# Patient Record
Sex: FEMALE | Race: WHITE | Employment: FULL TIME | URBAN - METROPOLITAN AREA
[De-identification: names, ages, dates, MRNs, and addresses within clinical notes are randomized per-mention and may not be internally consistent; named-entity substitution may affect disease eponyms.]

---

## 2017-03-23 ENCOUNTER — HOSPITAL ENCOUNTER (EMERGENCY)
Age: 52
Discharge: HOME | End: 2017-03-23
Attending: EMERGENCY MEDICINE

## 2017-03-23 VITALS
HEIGHT: 62 IN | OXYGEN SATURATION: 100 % | TEMPERATURE: 97.6 F | RESPIRATION RATE: 18 BRPM | WEIGHT: 158 LBS | HEART RATE: 81 BPM | SYSTOLIC BLOOD PRESSURE: 164 MMHG | BODY MASS INDEX: 29.08 KG/M2 | DIASTOLIC BLOOD PRESSURE: 85 MMHG

## 2017-03-23 DIAGNOSIS — Z20.7 EXPOSURE TO HEAD LICE: Primary | ICD-10-CM

## 2017-03-23 NOTE — UC PROVIDER NOTE
Patient is a 45 y.o. female presenting with lice. The history is provided by the patient. Head Lice   This is a new (Daughter has head lice so she wants to be checked) problem. Past Medical History:   Diagnosis Date    Breast cancer ([facility]     Left. Txd with chemo,radiation. Dr. Juan Francisco Torres of elbow     Left.  Chickenpox childhood    Diabetes mellitus type 2, controlled, without complications ([facility] 82//23    Dr. Daly Degree. Dr. Twyla Singh, ophth.  Dry eyes     Blair Garcias, OD. Dr. Robert Vela Elevated cholesterol     Environmental allergies     Gestational diabetes 2002    Glaucoma suspect of both eyes     [place]. Dr. Russ Childs.  Heartburn 08    Mononucleosis 2006    Vitamin D deficiency         Past Surgical History:   Procedure Laterality Date    HX BREAST BIOPSY Left 1998    due to cancer. Dr. Kirstin Rivas HX BREAST LUMPECTOMY Left 1999    with 18 lymph nodes removed. Dr. Katiana Sesay   ,     Dr. Brayden Matias         Family History   Problem Relation Age of Onset    Hypertension Mother     Diabetes Mother     High Cholesterol Mother     Heart Disease Father     Heart Surgery Father      CABG    Diabetes Father     Glaucoma Father     Colon Cancer Father 68    Breast Cancer Maternal Aunt      x2    Cancer Paternal Uncle      ?laryngeal    Diabetes Paternal Grandmother     Glaucoma Paternal Aunt         Social History     Social History    Marital status:      Spouse name: N/A    Number of children: N/A    Years of education: N/A     Occupational History    Not on file.      Social History Main Topics    Smoking status: Never Smoker    Smokeless tobacco: Never Used    Alcohol use 0.0 oz/week     0 Cans of beer per week      Comment: RARE    Drug use: No    Sexual activity: Yes     Partners: Male     Birth control/ protection: Surgical      Comment:  had a vasectomy     Other Topics Concern    Not on file     Social History Narrative                ALLERGIES: Review of patient's allergies indicates no known allergies. Review of Systems   Constitutional: Negative for activity change. HENT: Negative for congestion. Respiratory: Negative for cough. Skin: Negative for rash. Vitals:    03/23/17 0914   BP: 164/85   Pulse: 81   Resp: 18   Temp: 97.6 °F (36.4 °C)   SpO2: 100%   Weight: 71.7 kg (158 lb)   Height: 5' 2\" (1.575 m)       Physical Exam   Constitutional: She is oriented to person, place, and time. She appears well-developed and well-nourished. HENT:   Head: Normocephalic. No evidence of head lice   Eyes: EOM are normal.   Pulmonary/Chest: Effort normal.   Neurological: She is alert and oriented to person, place, and time. Skin: Skin is warm and dry. Psychiatric: She has a normal mood and affect. Her behavior is normal. Judgment and thought content normal.   Nursing note and vitals reviewed. MDM     Differential Diagnosis; Clinical Impression; Plan:     CLINICAL IMPRESSION:  Exposure to head lice  (primary encounter diagnosis)    Plan:  1.  Follow up with PCP  2.   3.   Risk of Significant Complications, Morbidity, and/or Mortality:   Presenting problems:  Low  Diagnostic procedures:  Low  Management options:  Low  Progress:   Patient progress:  Stable      Procedures

## 2017-06-23 DIAGNOSIS — E55.9 VITAMIN D DEFICIENCY: ICD-10-CM

## 2017-06-23 DIAGNOSIS — E11.9 CONTROLLED TYPE 2 DIABETES MELLITUS WITHOUT COMPLICATION, WITHOUT LONG-TERM CURRENT USE OF INSULIN (HCC): Primary | ICD-10-CM

## 2017-06-23 DIAGNOSIS — E78.2 MIXED DYSLIPIDEMIA: ICD-10-CM

## 2017-06-24 LAB
25(OH)D3+25(OH)D2 SERPL-MCNC: 33.2 NG/ML (ref 30–100)
ALBUMIN SERPL-MCNC: 4.1 G/DL (ref 3.5–5.5)
ALBUMIN/GLOB SERPL: 1.6 {RATIO} (ref 1.2–2.2)
ALP SERPL-CCNC: 67 IU/L (ref 39–117)
ALT SERPL-CCNC: 15 IU/L (ref 0–32)
APPEARANCE UR: CLEAR
AST SERPL-CCNC: 16 IU/L (ref 0–40)
BILIRUB SERPL-MCNC: 0.3 MG/DL (ref 0–1.2)
BILIRUB UR QL STRIP: NEGATIVE
BUN SERPL-MCNC: 19 MG/DL (ref 6–24)
BUN/CREAT SERPL: 32 (ref 9–23)
CALCIUM SERPL-MCNC: 9.3 MG/DL (ref 8.7–10.2)
CHLORIDE SERPL-SCNC: 98 MMOL/L (ref 96–106)
CHOLEST SERPL-MCNC: 246 MG/DL (ref 100–199)
CO2 SERPL-SCNC: 23 MMOL/L (ref 18–29)
COLOR UR: YELLOW
CREAT SERPL-MCNC: 0.6 MG/DL (ref 0.57–1)
EST. AVERAGE GLUCOSE BLD GHB EST-MCNC: 126 MG/DL
GLOBULIN SER CALC-MCNC: 2.6 G/DL (ref 1.5–4.5)
GLUCOSE SERPL-MCNC: 109 MG/DL (ref 65–99)
GLUCOSE UR QL: ABNORMAL
HBA1C MFR BLD: 6 % (ref 4.8–5.6)
HDLC SERPL-MCNC: 40 MG/DL
HGB UR QL STRIP: NEGATIVE
INTERPRETATION, 910389: NORMAL
KETONES UR QL STRIP: NEGATIVE
LDLC SERPL CALC-MCNC: 176 MG/DL (ref 0–99)
LEUKOCYTE ESTERASE UR QL STRIP: NEGATIVE
Lab: NORMAL
MICRO URNS: ABNORMAL
NITRITE UR QL STRIP: NEGATIVE
PH UR STRIP: 6 [PH] (ref 5–7.5)
POTASSIUM SERPL-SCNC: 4.5 MMOL/L (ref 3.5–5.2)
PROT SERPL-MCNC: 6.7 G/DL (ref 6–8.5)
PROT UR QL STRIP: NEGATIVE
SODIUM SERPL-SCNC: 139 MMOL/L (ref 134–144)
SP GR UR: 1.03 (ref 1–1.03)
TRIGL SERPL-MCNC: 148 MG/DL (ref 0–149)
TSH SERPL DL<=0.005 MIU/L-ACNC: 2.6 UIU/ML (ref 0.45–4.5)
UROBILINOGEN UR STRIP-MCNC: 0.2 MG/DL (ref 0.2–1)
VLDLC SERPL CALC-MCNC: 30 MG/DL (ref 5–40)

## 2017-06-29 ENCOUNTER — OFFICE VISIT (OUTPATIENT)
Dept: FAMILY MEDICINE CLINIC | Age: 52
End: 2017-06-29

## 2017-06-29 VITALS
HEIGHT: 62 IN | DIASTOLIC BLOOD PRESSURE: 72 MMHG | TEMPERATURE: 98.1 F | OXYGEN SATURATION: 98 % | SYSTOLIC BLOOD PRESSURE: 109 MMHG | HEART RATE: 66 BPM | WEIGHT: 162.2 LBS | RESPIRATION RATE: 16 BRPM | BODY MASS INDEX: 29.85 KG/M2

## 2017-06-29 DIAGNOSIS — R76.8 POSITIVE HEPATITIS C ANTIBODY TEST: ICD-10-CM

## 2017-06-29 DIAGNOSIS — Z83.511 FAMILY HISTORY OF GLAUCOMA IN FATHER: ICD-10-CM

## 2017-06-29 DIAGNOSIS — E66.3 OVERWEIGHT (BMI 25.0-29.9): ICD-10-CM

## 2017-06-29 DIAGNOSIS — H40.003 GLAUCOMA SUSPECT OF BOTH EYES: ICD-10-CM

## 2017-06-29 DIAGNOSIS — E78.2 MIXED DYSLIPIDEMIA: ICD-10-CM

## 2017-06-29 DIAGNOSIS — E11.9 CONTROLLED TYPE 2 DIABETES MELLITUS WITHOUT COMPLICATION, WITHOUT LONG-TERM CURRENT USE OF INSULIN (HCC): Primary | ICD-10-CM

## 2017-06-29 DIAGNOSIS — N93.8 DUB (DYSFUNCTIONAL UTERINE BLEEDING): ICD-10-CM

## 2017-06-29 DIAGNOSIS — E55.9 VITAMIN D DEFICIENCY: ICD-10-CM

## 2017-06-29 DIAGNOSIS — R76.8 FALSE POSITIVE SEROLOGICAL TEST FOR HEPATITIS C: ICD-10-CM

## 2017-06-29 DIAGNOSIS — Z85.3 PERSONAL HISTORY OF BREAST CANCER: ICD-10-CM

## 2017-06-29 RX ORDER — GLUCOSAMINE SULFATE 1500 MG
POWDER IN PACKET (EA) ORAL DAILY
COMMUNITY

## 2017-06-29 NOTE — PROGRESS NOTES
Chief Complaint   Patient presents with    Weight Management    Blood Pressure Check    Diabetes     A1C on 6/23/17 was 6.0    Documentation     release form from PCP approving pt to participate in weight loss program with child through [facility]; also wants to know what she and staff can do to get health insurance to cover invokana, insurance wants pt to take metformin per letter sent to pt from insurance company     1. Have you been to the ER, urgent care clinic since your last visit? Hospitalized since your last visit? Pt has been to  once since lov. 2. Have you seen or consulted any other health care providers outside of the [address] [place] [address] since your last visit? Include any pap smears or colon screening. No    In the event something were to happen to you and you were unable to speak on your behalf, do you have an Advance Directive/ Living Will in place stating your wishes? NO    If yes, do we have a copy on file NO    If no, would you like information:   Pt offered and declined.

## 2017-06-29 NOTE — PROGRESS NOTES
HISTORY OF PRESENT ILLNESS  Miguel Lopez is a 45 y.o. female presents with Results; Blood Pressure Check; Diabetes (A1C on 6/23/17 was 6.0); and Documentation (release form from PCP approving pt to participate in weight loss program with child through [facility]; also wants to know what she and staff can do to get health insurance to cover invokana, insurance wants pt to take metformin per letter sent to pt from insurance company)    Agree with nurse note. Diabetic pt with mixed dyslipidemia and Vit D deficiency presents to the office with a BP of 109/72. She weighs 162 lbs, lost 3 lbs since last ov. She requests her most recent labs from 06/23/17. Glucose was 109. BUN/creatinine was 32. Urine contained 3+ glucose. Hgb A1C was 6.0. LDL was 176, up from 75. Vit D was 33.2. TSH was 2.6. She forgets to take Crestor 20 mg at night. For DM, she takes Invokamet  mg daily, tolerating well. She received a letter from [address] [place], [place] [id number] 9 E dated 06/20/17 stating that preferred medications for DM are Metformin ER or Metformin containing products. She has a physician release form to be completed for the [address] [place] [address] Medicine, department of Peds and IM. It is a a diet and exercise program x16 weeks. Pt with DUB. Menses still come and go sporadically. She has a hx of a positive Hep C test. Confirmatory test was negative. Health Maintenance    Pt was referred in 12/2016 for colonoscopy and plans to schedule this soon. Pt who is a glaucoma suspect with family hx of glaucoma. plans to schedule yearly eye exam soon. Pt with hx of breast cancer; no recurrence. Her most recent mammogram was on 12/19/16; normal.     Written by ralph Crowder, as dictated by Dr. Ivan Jorge DO.    ROS    Review of Systems negative except as noted above in HPI.     ALLERGIES:  No Known Allergies    CURRENT MEDICATIONS:    Outpatient Prescriptions Marked as Taking for the 17 encounter (Office Visit) with Mikel Steiner, DO   Medication Sig Dispense Refill    cholecalciferol (VITAMIN D3) 1,000 unit cap Take  by mouth daily.  canagliflozin-metFORMIN (INVOKAMET)  mg tab Take 1 Tab by mouth daily. Indications: type 2 diabetes mellitus 30 Tab 11    rosuvastatin (CRESTOR) 20 mg tablet Take 1 Tab by mouth nightly. Indications: HYPERLIPIDEMIA 90 Tab 3    enalapril (VASOTEC) 2.5 mg tablet Take 1 Tab by mouth daily. Indications: DIABETIC NEPHROPATHY 30 Tab 5    ibuprofen (MOTRIN) 600 mg tablet Take 1 Tab by mouth every six (6) hours as needed for Pain. 20 Tab 0       PAST MEDICAL HISTORY:    Past Medical History:   Diagnosis Date    Breast cancer ([facility]     Left. Txd with chemo,radiation. Dr. Palma Burn of elbow     Left.  Chickenpox childhood    Diabetes mellitus type 2, controlled, without complications ([facility]     Dr. Liam Hunter. Dr. Jacob Khalil, ophth.  Dry eyes     Blair Garcias, SELAM. Dr. Marion Doing Elevated cholesterol     Environmental allergies     Gestational diabetes 2002    Glaucoma suspect of both eyes     [place]. Dr. Rajendra William.  Heartburn 08    Mononucleosis 2006    Vitamin D deficiency        PAST SURGICAL HISTORY:    Past Surgical History:   Procedure Laterality Date    HX BREAST BIOPSY Left 1998    due to cancer. Dr. Susan Pires HX BREAST LUMPECTOMY Left 1999    with 18 lymph nodes removed.  Dr. Marcela Salazar   ,     Dr. Marley Herron:    Family History   Problem Relation Age of Onset    Hypertension Mother     Diabetes Mother     High Cholesterol Mother     Heart Disease Father     Heart Surgery Father      CABG    Diabetes Father     Glaucoma Father     Colon Cancer Father 68    Breast Cancer Maternal Aunt      x2    Cancer Paternal Uncle      ?laryngeal    Diabetes Paternal Grandmother    [facility] Glaucoma Paternal Aunt        SOCIAL HISTORY:    Social History     Social History    Marital status:      Spouse name: N/A    Number of children: N/A    Years of education: N/A     Social History Main Topics    Smoking status: Never Smoker    Smokeless tobacco: Never Used    Alcohol use 0.0 oz/week     0 Cans of beer per week      Comment: RARE    Drug use: No    Sexual activity: Yes     Partners: Male     Birth control/ protection: Surgical      Comment:  had a vasectomy     Other Topics Concern    None     Social History Narrative       IMMUNIZATIONS:    Immunization History   Administered Date(s) Administered    Influenza Vaccine 11/09/2015    Influenza Vaccine Split 11/03/2011    Influenza Vaccine Whole 10/01/2009         PHYSICAL EXAMINATION    Vital Signs    Visit Vitals    /72 (BP 1 Location: Right arm, BP Patient Position: Sitting)    Pulse 66    Temp 98.1 °F (36.7 °C) (Oral)    Resp 16    Ht 5' 2.01\" (1.575 m)    Wt 162 lb 3.2 oz (73.6 kg)    SpO2 98%    BMI 29.66 kg/m2       Weight Metrics 6/29/2017 3/23/2017 12/28/2016 8/1/2016 4/22/2016 10/22/2015 6/9/2015   Weight 162 lb 3.2 oz 158 lb 165 lb 8 oz 177 lb 3.2 oz 196 lb 9.6 oz 195 lb 9.6 oz 197 lb   BMI 29.66 kg/m2 28.9 kg/m2 29.79 kg/m2 31.87 kg/m2 35.36 kg/m2 35.5 kg/m2 36.02 kg/m2       General appearance - Well nourished. Well appearing. Well developed. No acute distress. Overweight. Head - Normocephalic. Atraumatic. Eyes - pupils equal and reactive. Extraocular eye movements intact. Sclera anicteric. Mildly injected sclera. Ears - Hearing is grossly normal bilaterally. Nose - normal and patent. No polyps noted. No erythema. No discharge. Mouth - mucous membranes with adequate moisture. Posterior pharynx normal with cobblestone appearance. No erythema, white exudate or obstruction. Neck - supple. Midline trachea. No carotid bruits noted bilaterally. No thyromegaly noted.   Chest - clear to auscultation bilaterally anteriorly and posteriorly. No wheezes. No rales or rhonchi. Breath sounds are symmetrical bilaterally. Unlabored respirations. Heart - normal rate. Regular rhythm. Normal S1, S2. No murmur noted. No rubs, clicks or gallops noted. Abdomen - soft and distended. No masses or organomegaly. No rebound, rigidity or guarding. Bowel sounds normal x 4 quadrants. No tenderness noted. Neurological - awake, alert and oriented to person, place, and time and event. Cranial nerves II through XII intact. Clear speech. Muscle strength is +5/5 x 4 extremities. Sensation is intact to light touch bilaterally. Steady gait. Heme/Lymph - peripheral pulses normal x 4 extremities. No peripheral edema is noted. Musculoskeletal - Intact x 4 extremities. Full ROM x 4 extremities. No pain with movement. Back exam - normal range of motion. No pain on palpation of the spinous processes in the cervical, thoracic, lumbar, sacral regions. No CVA tenderness. Skin - no rashes, erythema, ecchymosis, lacerations, abrasions, suspicious moles noted  Psychological -   normal behavior, dress and thought processes. Good insight. Good eye contact. Normal affect. Appropriate mood. Normal speech.       DATA REVIEWED    Results for orders placed or performed in visit on 06/23/17   TSH 3RD GENERATION   Result Value Ref Range    TSH 2.600 0.450 - 4.500 uIU/mL   VITAMIN D, 25 HYDROXY   Result Value Ref Range    VITAMIN D, 25-HYDROXY 33.2 30.0 - 100.0 ng/mL   LIPID PANEL   Result Value Ref Range    Cholesterol, total 246 (H) 100 - 199 mg/dL    Triglyceride 148 0 - 149 mg/dL    HDL Cholesterol 40 >39 mg/dL    VLDL, calculated 30 5 - 40 mg/dL    LDL, calculated 176 (H) 0 - 99 mg/dL   HEMOGLOBIN A1C WITH EAG   Result Value Ref Range    Hemoglobin A1c 6.0 (H) 4.8 - 5.6 %    Estimated average glucose 126 mg/dL   URINALYSIS W/ RFLX MICROSCOPIC   Result Value Ref Range    Specific Gravity 1.027 1.005 - 1.030    pH (UA) 6.0 5.0 - 7.5    Color Yellow Yellow    Appearance Clear Clear    Leukocyte Esterase Negative Negative    Protein Negative Negative/Trace    Glucose 3+ (A) Negative    Ketone Negative Negative    Blood Negative Negative    Bilirubin Negative Negative    Urobilinogen 0.2 0.2 - 1.0 mg/dL    Nitrites Negative Negative    Microscopic Examination Comment    METABOLIC PANEL, COMPREHENSIVE   Result Value Ref Range    Glucose 109 (H) 65 - 99 mg/dL    BUN 19 6 - 24 mg/dL    Creatinine 0.60 0.57 - 1.00 mg/dL    GFR est non- >59 mL/min/1.73    GFR est  >59 mL/min/1.73    BUN/Creatinine ratio 32 (H) 9 - 23    Sodium 139 134 - 144 mmol/L    Potassium 4.5 3.5 - 5.2 mmol/L    Chloride 98 96 - 106 mmol/L    CO2 23 18 - 29 mmol/L    Calcium 9.3 8.7 - 10.2 mg/dL    Protein, total 6.7 6.0 - 8.5 g/dL    Albumin 4.1 3.5 - 5.5 g/dL    GLOBULIN, TOTAL 2.6 1.5 - 4.5 g/dL    A-G Ratio 1.6 1.2 - 2.2    Bilirubin, total 0.3 0.0 - 1.2 mg/dL    Alk. phosphatase 67 39 - 117 IU/L    AST (SGOT) 16 0 - 40 IU/L    ALT (SGPT) 15 0 - 32 IU/L   CVD REPORT   Result Value Ref Range    INTERPRETATION Note    DIABETES PATIENT EDUCATION   Result Value Ref Range    PDF Image Not applicable      Lab Results   Component Value Date/Time    Microalb/Creat ratio (ug/mg creat.) 7.0 12/21/2016 08:07 AM     Lab Results   Component Value Date/Time    Hep C Virus Ab 2.3 12/21/2016 08:07 AM       ASSESSMENT and PLAN      ICD-10-CM ICD-9-CM    1. Controlled type 2 diabetes mellitus without complication, without long-term current use of insulin (HCC) E11.9 250.00 canagliflozin-metFORMIN (INVOKAMET)  mg tab   2. Mixed dyslipidemia E78.2 272.2     worse with occ Crestor 20 mg use   3. Vitamin D deficiency E55.9 268.9 cholecalciferol (VITAMIN D3) 1,000 unit cap    stable   4. Glaucoma suspect of both eyes H40.003 365.00    5. Family history of glaucoma in father Z80.65 V19.11    6.  Personal history of breast cancer Z85.3 V10.3     stable 7. Positive hepatitis C antibody test R76.8 795.79    8. False positive serological test for hepatitis C R76.8 795.79     negative confirmatory test   9. Overweight (BMI 25.0-29. 9) E66.3 278.02    10. DUB (dysfunctional uterine bleeding) N93.8 626.8        Discussed the patient's BMI with her. The BMI follow up plan is as follows: I have counseled this patient on diet and exercise regimens. Decrease carbohydrates (white foods, sweet foods, sweet drinks and alcohol), increase green leafy vegetables and protein (lean meats and beans) with each meal.  Avoid fried foods. Eat 3-5 small meals daily. Do not skip meals. Increase water intake. Increase physical activity to 30 minutes daily for health benefit or 60 minutes daily to prevent weight regain, as tolerated. Get 7-8 hours uninterrupted sleep nightly. Chart reviewed and updated. My nurse, Dominique Walsh, called [phone] number and confirmed pt is approved from 05/30/17 until 06/29/18 for Invokamet. Continue current medications and care. Recommend OTC Vit D 5,000IU daily. Recommend keeping Crestor by toothbrush to help you remember to take it at night. Prescriptions written and sent to pharmacy; medication side effects discussed. Invokamet  mg. Most recent tests reviewed from 06/23/17. Recheck pertinent labs when fasting in 3 months. Counseled patient on health concerns: DM, BP, and cholesterol. Immunizations noted. Offered empathy, support, legitimation, prayers, partnership to patient. Praised patient for progress. [facility] [employer] [facility] Release Form completed. Original given to patient today and copied to the chart. SEE SCANNED DOCUMENT. Follow-up Disposition:  Return in about 6 months (around 12/29/2017) for dm, bp, results, cholesterol. Patient was offered a choice/choices in the treatment plan today. Patient expresses understanding of the plan and agrees with recommendations. Patient declines any additional handouts. Patient is satisfied with previous handouts received from our office    Written by ralph Bocanegra, as dictated by Dr. Jak Alejandre DO. Documentation True and Accepted by Mariusz Garcia.  Jada Sims.

## 2017-06-29 NOTE — MR AVS SNAPSHOT
Visit Information Date & Time Provider Department Dept. Phone Encounter #  
 6/29/2017  8:45 AM Sadie Castañeda DO [facility] [phone] Follow-up Instructions Return in about 6 months (around 12/29/2017) for dm, bp, results, cholesterol. Upcoming Health Maintenance Date Due FOBT Q 1 YEAR AGE 50-75 10/20/2017* EYE EXAM RETINAL OR DILATED Q1 12/14/2017* INFLUENZA AGE 9 TO ADULT 8/1/2017 MICROALBUMIN Q1 12/21/2017 HEMOGLOBIN A1C Q6M 12/23/2017 FOOT EXAM Q1 12/28/2017 LIPID PANEL Q1 6/23/2018 PAP AKA CERVICAL CYTOLOGY 11/9/2018 BREAST CANCER SCRN MAMMOGRAM 12/16/2018 DTaP/Tdap/Td series (2 - Td) 6/29/2027 *Topic was postponed. The date shown is not the original due date. Allergies as of 6/29/2017  Review Complete On: 6/29/2017 By: Sadie Castañeda DO No Known Allergies Current Immunizations  Reviewed on 6/29/2017 Name Date Influenza Vaccine 11/9/2015 Influenza Vaccine Split 11/3/2011 Influenza Vaccine Whole 10/1/2009 Reviewed by Sadie Castañeda DO on 6/29/2017 at  9:39 AM  
You Were Diagnosed With   
  
 Codes Comments Controlled type 2 diabetes mellitus without complication, without long-term current use of insulin ([facility]    -  Primary ICD-10-CM: E11.9 ICD-9-CM: 250.00 Mixed dyslipidemia     ICD-10-CM: E78.2 ICD-9-CM: 272.2 worse with occ Crestor 20 mg use Vitamin D deficiency     ICD-10-CM: E55.9 ICD-9-CM: 268.9 stable Glaucoma suspect of both eyes     ICD-10-CM: H40.003 ICD-9-CM: 365.00 Family history of glaucoma in father     ICD-10-CM: Z80.65 ICD-9-CM: V19.11 Personal history of breast cancer     ICD-10-CM: Z85.3 ICD-9-CM: V10.3 stable Positive hepatitis C antibody test     ICD-10-CM: R76.8 ICD-9-CM: 795.79 False positive serological test for hepatitis C     ICD-10-CM: R76.8 ICD-9-CM: 795.79 negative confirmatory test  
 Overweight (BMI 25.0-29. 9)     ICD-10-CM: E42.7 ICD-9-CM: 278.02   
 DUB (dysfunctional uterine bleeding)     ICD-10-CM: N93.8 ICD-9-CM: 626.8 Vitals BP Pulse Temp Resp Height(growth percentile) Weight(growth percentile) 109/72 (BP 1 Location: Right arm, BP Patient Position: Sitting) 66 98.1 °F (36.7 °C) (Oral) 16 5' 2.01\" (1.575 m) 162 lb 3.2 oz (73.6 kg) SpO2 BMI OB Status Smoking Status 98% 29.66 kg/m2 Menopause Never Smoker BMI and BSA Data Body Mass Index Body Surface Area  
 29.66 kg/m 2 1.79 m 2 Preferred Pharmacy Pharmacy Name Phone [address] [facility] [phone] Your Updated Medication List  
  
   
This list is accurate as of: 6/29/17  9:46 AM.  Always use your most recent med list.  
  
  
  
  
 canagliflozin-metFORMIN  mg Tab Commonly known as:  Sissy Dues Take 1 Tab by mouth daily. Indications: type 2 diabetes mellitus  
  
 enalapril 2.5 mg tablet Commonly known as:  Edwin Clapper Take 1 Tab by mouth daily. Indications: DIABETIC NEPHROPATHY  
  
 ibuprofen 600 mg tablet Commonly known as:  MOTRIN Take 1 Tab by mouth every six (6) hours as needed for Pain. rosuvastatin 20 mg tablet Commonly known as:  CRESTOR Take 1 Tab by mouth nightly. Indications: HYPERLIPIDEMIA  
  
 VITAMIN D3 1,000 unit Cap Generic drug:  cholecalciferol Take  by mouth daily. Prescriptions Sent to Pharmacy Refills  
 canagliflozin-metFORMIN (INVOKAMET)  mg tab 11 Sig: Take 1 Tab by mouth daily. Indications: type 2 diabetes mellitus Class: Normal  
 Pharmacy: [address] [facility] #: [phone] Route: Oral  
  
Follow-up Instructions Return in about 6 months (around 12/29/2017) for dm, bp, results, cholesterol. Introducing [facility] HE[facility]! [facility] introduces [employer] patient portal. Now you can access parts of your medical record, email your doctor's office, and request medication refills online. 1. In your internet browser, go to [email or web address] 2. Click on the First Time User? Click Here link in the Sign In box. You will see the New Member Sign Up page. 3. Enter your Dotted Block Access Code exactly as it appears below. You will not need to use this code after youve completed the sign-up process. If you do not sign up before the expiration date, you must request a new code. · Dotted Block Access Code: [email or web address] Expires: 9/27/2017  9:46 AM 
 
4. Enter the last four digits of your Social Security Number (xxxx) and Date of Birth (mm/dd/yyyy) as indicated and click Submit. You will be taken to the next sign-up page. 5. Create a Dotted Block ID. This will be your Dotted Block login ID and cannot be changed, so think of one that is secure and easy to remember. 6. Create a Dotted Block password. You can change your password at any time. 7. Enter your Password Reset Question and Answer. This can be used at a later time if you forget your password. 8. Enter your e-mail address. You will receive e-mail notification when new information is available in [address] 9. Click Sign Up. You can now view and download portions of your medical record. 10. Click the Download Summary menu link to download a portable copy of your medical information. If you have questions, please visit the Frequently Asked Questions section of the Dotted Block website. Remember, Dotted Block is NOT to be used for urgent needs. For medical emergencies, dial 911. Now available from your iPhone and Android! Please provide this summary of care documentation to your next provider. Your primary care clinician is listed as Jaden Moore. If you have any questions after today's visit, please call [phone].

## 2017-07-14 DIAGNOSIS — E11.9 CONTROLLED TYPE 2 DIABETES MELLITUS WITHOUT COMPLICATION, WITHOUT LONG-TERM CURRENT USE OF INSULIN (HCC): ICD-10-CM

## 2017-07-17 RX ORDER — CANAGLIFLOZIN AND METFORMIN HYDROCHLORIDE 50; 500 MG/1; MG/1
TABLET, FILM COATED ORAL
Qty: 30 TAB | Refills: 11 | Status: SHIPPED | OUTPATIENT
Start: 2017-07-17 | End: 2018-03-01 | Stop reason: SDUPTHER

## 2017-10-19 DIAGNOSIS — R80.9 MICROALBUMINURIA: ICD-10-CM

## 2017-10-19 DIAGNOSIS — E11.9 DIABETES MELLITUS TYPE 2, CONTROLLED, WITHOUT COMPLICATIONS (HCC): ICD-10-CM

## 2017-10-23 NOTE — TELEPHONE ENCOUNTER
Future Appointments:  12/29/2017 10:00 AM   DO REYMUNDO Carter      Last Appointment With Me:  6/29/2017

## 2017-10-24 RX ORDER — ENALAPRIL MALEATE 2.5 MG/1
TABLET ORAL
Qty: 90 TAB | Refills: 0 | Status: SHIPPED | OUTPATIENT
Start: 2017-10-24 | End: 2017-12-29 | Stop reason: SDUPTHER

## 2017-12-29 ENCOUNTER — OFFICE VISIT (OUTPATIENT)
Dept: FAMILY MEDICINE CLINIC | Age: 52
End: 2017-12-29

## 2017-12-29 VITALS
OXYGEN SATURATION: 99 % | DIASTOLIC BLOOD PRESSURE: 79 MMHG | BODY MASS INDEX: 30.95 KG/M2 | HEIGHT: 62 IN | HEART RATE: 68 BPM | TEMPERATURE: 97.5 F | SYSTOLIC BLOOD PRESSURE: 116 MMHG | RESPIRATION RATE: 18 BRPM | WEIGHT: 168.2 LBS

## 2017-12-29 DIAGNOSIS — Z83.3 FAMILY HISTORY OF DIABETES MELLITUS (DM): ICD-10-CM

## 2017-12-29 DIAGNOSIS — Z12.11 COLON CANCER SCREENING: ICD-10-CM

## 2017-12-29 DIAGNOSIS — Z80.3 FAMILY HISTORY OF BREAST CANCER: ICD-10-CM

## 2017-12-29 DIAGNOSIS — E66.9 OBESITY, CLASS I, BMI 30-34.9: ICD-10-CM

## 2017-12-29 DIAGNOSIS — Z83.511 FAMILY HISTORY OF GLAUCOMA IN FATHER: ICD-10-CM

## 2017-12-29 DIAGNOSIS — Z80.0 FAMILY HISTORY OF COLON CANCER: ICD-10-CM

## 2017-12-29 DIAGNOSIS — Z85.3 PERSONAL HISTORY OF BREAST CANCER: ICD-10-CM

## 2017-12-29 DIAGNOSIS — R80.9 MICROALBUMINURIA: ICD-10-CM

## 2017-12-29 DIAGNOSIS — R81 GLUCOSURIA: ICD-10-CM

## 2017-12-29 DIAGNOSIS — E55.9 VITAMIN D DEFICIENCY: ICD-10-CM

## 2017-12-29 DIAGNOSIS — E11.9 CONTROLLED TYPE 2 DIABETES MELLITUS WITHOUT COMPLICATION, WITHOUT LONG-TERM CURRENT USE OF INSULIN (HCC): Primary | ICD-10-CM

## 2017-12-29 DIAGNOSIS — E78.2 MIXED DYSLIPIDEMIA: ICD-10-CM

## 2017-12-29 DIAGNOSIS — Z82.49 FAMILY HISTORY OF HEART DISEASE: ICD-10-CM

## 2017-12-29 DIAGNOSIS — H40.003 GLAUCOMA SUSPECT OF BOTH EYES: ICD-10-CM

## 2017-12-29 PROBLEM — E66.3 OVERWEIGHT (BMI 25.0-29.9): Status: RESOLVED | Noted: 2017-06-29 | Resolved: 2017-12-29

## 2017-12-29 LAB — HBA1C MFR BLD HPLC: 6 %

## 2017-12-29 RX ORDER — ROSUVASTATIN CALCIUM 20 MG/1
20 TABLET, COATED ORAL
Qty: 90 TAB | Refills: 3 | Status: SHIPPED | OUTPATIENT
Start: 2017-12-29 | End: 2019-01-14 | Stop reason: SDUPTHER

## 2017-12-29 RX ORDER — ENALAPRIL MALEATE 2.5 MG/1
2.5 TABLET ORAL DAILY
Qty: 90 TAB | Refills: 3 | Status: SHIPPED | OUTPATIENT
Start: 2017-12-29 | End: 2018-01-22 | Stop reason: SDUPTHER

## 2017-12-29 NOTE — PROGRESS NOTES
Flako Vargas is a 47 y.o. female  Chief Complaint   Patient presents with    Diabetes     follow up     1. Have you been to the ER, urgent care clinic since your last visit? Hospitalized since your last visit? No    2. Have you seen or consulted any other health care providers outside of the [address] [place] [address] since your last visit? Include any pap smears or colon screening.  No    Pt received influenza 10/17/2017         Diabetic foot exam performed by Rudolph Benedict LPN     Measurement  Response Nurse Comment Physician Comment   Monofilament  R - normal sensation with micro filament  L - normal sensation with micro filament     Pulse DP R - present  L - present     Pulse TP R - present  L - present     Structural deformity R - None  L - None     Skin Integrity / Deformity R - None  L - None        Reviewed by:

## 2017-12-29 NOTE — PATIENT INSTRUCTIONS
Learning About High Cholesterol  What is high cholesterol? Cholesterol is a type of fat in your blood. It is needed for many body functions, such as making new cells. Cholesterol is made by your body. It also comes from food you eat. If you have too much cholesterol, it starts to build up in your arteries. This is called hardening of the arteries, or atherosclerosis. High cholesterol raises your risk of a heart attack and stroke. There are different types of cholesterol. LDL is the \"bad\" cholesterol. High LDL can raise your risk for heart disease, heart attack, and stroke. HDL is the \"good\" cholesterol. High HDL is linked with a lower risk for heart disease, heart attack, and stroke. Your cholesterol levels help your doctor find out your risk for having a heart attack or stroke. How can you prevent high cholesterol? A heart-healthy lifestyle can help you prevent high cholesterol. This lifestyle helps lower your risk for a heart attack and stroke. · Eat heart-healthy foods. ¨ Eat fruits, vegetables, whole grains (like oatmeal), dried beans and peas, nuts and seeds, soy products (like tofu), and fat-free or low-fat dairy products. ¨ Replace butter, margarine, and hydrogenated or partially hydrogenated oils with olive and canola oils. (Canola oil margarine without trans fat is fine.)  ¨ Replace red meat with fish, poultry, and soy protein (like tofu). ¨ Limit processed and packaged foods like chips, crackers, and cookies. · Be active. Exercise can improve your cholesterol level. Get at least 30 minutes of exercise on most days of the week. Walking is a good choice. You also may want to do other activities, such as running, swimming, cycling, or playing tennis or team sports. · Stay at a healthy weight. Lose weight if you need to. · Don't smoke. If you need help quitting, talk to your doctor about stop-smoking programs and medicines. These can increase your chances of quitting for good.   How is high cholesterol treated? The goal of treatment is to reduce your chances of having a heart attack or stroke. The goal is not to lower your cholesterol numbers only. · You may make lifestyle changes, such as eating healthy foods, not smoking, losing weight, and being more active. · You may have to take medicine. Follow-up care is a key part of your treatment and safety. Be sure to make and go to all appointments, and call your doctor if you are having problems. It's also a good idea to know your test results and keep a list of the medicines you take. Where can you learn more? Go to http://thania-chong.info/. Enter F116 in the search box to learn more about \"Learning About High Cholesterol. \"  Current as of: September 21, 2016  Content Version: 11.4  © 9654-9267 [employer]. Care instructions adapted under license by [employer] (which disclaims liability or warranty for this information). If you have questions about a medical condition or this instruction, always ask your healthcare professional. [place] [address] any warranty or liability for your use of this information. High Cholesterol: Care Instructions  Your Care Instructions    Cholesterol is a type of fat in your blood. It is needed for many body functions, such as making new cells. Cholesterol is made by your body. It also comes from food you eat. High cholesterol means that you have too much of the fat in your blood. This raises your risk of a heart attack and stroke. LDL and HDL are part of your total cholesterol. LDL is the \"bad\" cholesterol. High LDL can raise your risk for heart disease, heart attack, and stroke. HDL is the \"good\" cholesterol. It helps clear bad cholesterol from the body. High HDL is linked with a lower risk of heart disease, heart attack, and stroke. Your cholesterol levels help your doctor find out your risk for having a heart attack or stroke.  You and your doctor can talk about whether you need to lower your risk and what treatment is best for you. A heart-healthy lifestyle along with medicines can help lower your cholesterol and your risk. The way you choose to lower your risk will depend on how high your risk is for heart attack and stroke. It will also depend on how you feel about taking medicines. Follow-up care is a key part of your treatment and safety. Be sure to make and go to all appointments, and call your doctor if you are having problems. It's also a good idea to know your test results and keep a list of the medicines you take. How can you care for yourself at home? · Eat a variety of foods every day. Good choices include fruits, vegetables, whole grains (like oatmeal), dried beans and peas, nuts and seeds, soy products (like tofu), and fat-free or low-fat dairy products. · Replace butter, margarine, and hydrogenated or partially hydrogenated oils with olive and canola oils. (Canola oil margarine without trans fat is fine.)  · Replace red meat with fish, poultry, and soy protein (like tofu). · Limit processed and packaged foods like chips, crackers, and cookies. · Bake, broil, or steam foods. Don't milton them. · Be physically active. Get at least 30 minutes of exercise on most days of the week. Walking is a good choice. You also may want to do other activities, such as running, swimming, cycling, or playing tennis or team sports. · Stay at a healthy weight or lose weight by making the changes in eating and physical activity listed above. Losing just a small amount of weight, even 5 to 10 pounds, can reduce your risk for having a heart attack or stroke. · Do not smoke. When should you call for help? Watch closely for changes in your health, and be sure to contact your doctor if:  ? · You need help making lifestyle changes. ? · You have questions about your medicine. Where can you learn more?   Go to http://thania-chong.info/. Enter F094 in the search box to learn more about \"High Cholesterol: Care Instructions. \"  Current as of: September 21, 2016  Content Version: 11.4  © 6950-2537 [employer]. Care instructions adapted under license by [employer] (which disclaims liability or warranty for this information). If you have questions about a medical condition or this instruction, always ask your healthcare professional. [address] any warranty or liability for your use of this information. Learning About Diabetes Food Guidelines  Your Care Instructions    Meal planning is important to manage diabetes. It helps keep your blood sugar at a target level (which you set with your doctor). You don't have to eat special foods. You can eat what your family eats, including sweets once in a while. But you do have to pay attention to how often you eat and how much you eat of certain foods. You may want to work with a dietitian or a certified diabetes educator (CDE) to help you plan meals and snacks. A dietitian or CDE can also help you lose weight if that is one of your goals. What should you know about eating carbs? Managing the amount of carbohydrate (carbs) you eat is an important part of healthy meals when you have diabetes. Carbohydrate is found in many foods. · Learn which foods have carbs. And learn the amounts of carbs in different foods. ¨ Bread, cereal, pasta, and rice have about 15 grams of carbs in a serving. A serving is 1 slice of bread (1 ounce), ½ cup of cooked cereal, or 1/3 cup of cooked pasta or rice. ¨ Fruits have 15 grams of carbs in a serving. A serving is 1 small fresh fruit, such as an apple or orange; ½ of a banana; ½ cup of cooked or canned fruit; ½ cup of fruit juice; 1 cup of melon or raspberries; or 2 tablespoons of dried fruit. ¨ Milk and no-sugar-added yogurt have 15 grams of carbs in a serving.  A serving is 1 cup of milk or 2/3 cup of no-sugar-added yogurt. ¨ Starchy vegetables have 15 grams of carbs in a serving. A serving is ½ cup of mashed potatoes or sweet potato; 1 cup winter squash; ½ of a small baked potato; ½ cup of cooked beans; or ½ cup cooked corn or green peas. · Learn how much carbs to eat each day and at each meal. A dietitian or CDE can teach you how to keep track of the amount of carbs you eat. This is called carbohydrate counting. · If you are not sure how to count carbohydrate grams, use the Plate Method to plan meals. It is a good, quick way to make sure that you have a balanced meal. It also helps you spread carbs throughout the day. ¨ Divide your plate by types of foods. Put non-starchy vegetables on half the plate, meat or other protein food on one-quarter of the plate, and a grain or starchy vegetable in the final quarter of the plate. To this you can add a small piece of fruit and 1 cup of milk or yogurt, depending on how many carbs you are supposed to eat at a meal.  · Try to eat about the same amount of carbs at each meal. Do not \"save up\" your daily allowance of carbs to eat at one meal.  · Proteins have very little or no carbs per serving. Examples of proteins are beef, chicken, turkey, fish, eggs, tofu, cheese, cottage cheese, and peanut butter. A serving size of meat is 3 ounces, which is about the size of a deck of cards. Examples of meat substitute serving sizes (equal to 1 ounce of meat) are 1/4 cup of cottage cheese, 1 egg, 1 tablespoon of peanut butter, and ½ cup of tofu. How can you eat out and still eat healthy? · Learn to estimate the serving sizes of foods that have carbohydrate. If you measure food at home, it will be easier to estimate the amount in a serving of restaurant food. · If the meal you order has too much carbohydrate (such as potatoes, corn, or baked beans), ask to have a low-carbohydrate food instead. Ask for a salad or green vegetables.   · If you use insulin, check your blood sugar before and after eating out to help you plan how much to eat in the future. · If you eat more carbohydrate at a meal than you had planned, take a walk or do other exercise. This will help lower your blood sugar. What else should you know? · Limit saturated fat, such as the fat from meat and dairy products. This is a healthy choice because people who have diabetes are at higher risk of heart disease. So choose lean cuts of meat and nonfat or low-fat dairy products. Use olive or canola oil instead of butter or shortening when cooking. · Don't skip meals. Your blood sugar may drop too low if you skip meals and take insulin or certain medicines for diabetes. · Check with your doctor before you drink alcohol. Alcohol can cause your blood sugar to drop too low. Alcohol can also cause a bad reaction if you take certain diabetes medicines. Follow-up care is a key part of your treatment and safety. Be sure to make and go to all appointments, and call your doctor if you are having problems. It's also a good idea to know your test results and keep a list of the medicines you take. Where can you learn more? Go to http://thania-chong.info/. Enter I654 in the search box to learn more about \"Learning About Diabetes Food Guidelines. \"  Current as of: March 13, 2017  Content Version: 11.4  © 6545-8756 [employer]. Care instructions adapted under license by [employer] (which disclaims liability or warranty for this information). If you have questions about a medical condition or this instruction, always ask your healthcare professional. [place] [address] any warranty or liability for your use of this information. Starting a Weight Loss Plan: Care Instructions  Your Care Instructions    If you are thinking about losing weight, it can be hard to know where to start. Your doctor can help you set up a weight loss plan that best meets your needs. You may want to take a class on nutrition or exercise, or join a weight loss support group. If you have questions about how to make changes to your eating or exercise habits, ask your doctor about seeing a registered dietitian or an exercise specialist.  It can be a big challenge to lose weight. But you do not have to make huge changes at once. Make small changes, and stick with them. When those changes become habit, add a few more changes. If you do not think you are ready to make changes right now, try to pick a date in the future. Make an appointment to see your doctor to discuss whether the time is right for you to start a plan. Follow-up care is a key part of your treatment and safety. Be sure to make and go to all appointments, and call your doctor if you are having problems. It's also a good idea to know your test results and keep a list of the medicines you take. How can you care for yourself at home? · Set realistic goals. Many people expect to lose much more weight than is likely. A weight loss of 5% to 10% of your body weight may be enough to improve your health. · Get family and friends involved to provide support. Talk to them about why you are trying to lose weight, and ask them to help. They can help by participating in exercise and having meals with you, even if they may be eating something different. · Find what works best for you. If you do not have time or do not like to cook, a program that offers meal replacement bars or shakes may be better for you. Or if you like to prepare meals, finding a plan that includes daily menus and recipes may be best.  · Ask your doctor about other health professionals who can help you achieve your weight loss goals. ¨ A dietitian can help you make healthy changes in your diet.   ¨ An exercise specialist or  can help you develop a safe and effective exercise program.  ¨ A counselor or psychiatrist can help you cope with issues such as depression, anxiety, or family problems that can make it hard to focus on weight loss. · Consider joining a support group for people who are trying to lose weight. Your doctor can suggest groups in your area. Where can you learn more? Go to http://thania-chong.info/. Enter K035 in the search box to learn more about \"Starting a Weight Loss Plan: Care Instructions. \"  Current as of: October 13, 2016  Content Version: 11.4  © 2688-9429 [employer]. Care instructions adapted under license by [employer] (which disclaims liability or warranty for this information). If you have questions about a medical condition or this instruction, always ask your healthcare professional. [place] [address] any warranty or liability for your use of this information. WEIGHT LOSS PLAN    1. Read food labels and count calories. Goal 0424-7609 calories daily for women and 6173-2604 calories daily for men. Achieve this by decreasing caloric intake by 500 calories by weekly increments. NOTE:  1 pound = 3500 calories! Www.loseit. Guanri  Www.All-Scrappal.Guanri  Www.ioSemantics  Www.Borqs. gov  Weight watchers mobile    Calories needed to lose 1-2 pounds a week = 10 x your weight in pounds    2. Increase water intake. Per [facility], it is important to drink 1/2 your body weight in ounces of water daily. Decrease your water consumption 2-3 hours before bedtime to prevent sleep disturbance from frequent urination. 3.  Decrease sugary beverages. Each can or glass of soda increases your risk of obesity by 60%. Can lose 10 pounds in a month by avoiding any soda. 12 oz can of soda = 140 calories, 16 oz cup of sweet tea = 200 calories    16 oz orange juice = 200 calories, 10 oz apple juice = 150 calories   32 oz sports drink = 200 calories, 16 oz punch = 240 calories   3.5 oz alcohol = 100-150 calories     4.   Avoid High Fructose Corn Syrup products. This ingredient makes products highly addictive! 5. Exercise 30 minutes daily 5 days weekly, minimally. If you burn 3500 calories that equals a pound! Use a pedometer to count steps. Visit www. FanMiles for a free pedometer and diet recommendations. Your maximum heart rate = 220 - your age    Never exercise at your maximum heart rate. See handout for target heart rate. 6.  Decrease carbohydrates (white bread, pasta, rice, potatoes, sweet foods and sweet drinks like soda, tea, coffee, juice and sports drinks). Increase fiber and protein. Goal:   calories daily of carbohydrates     Try CHROMIUM PICONATE 200 MG THREE TIMES DAILY,    to decrease Premenstrual carbohydrate cravings. 7.  Eat 3-5 small meals daily, include lots of protein (beans/legumes, nuts, lean meat, eggs) and green vegetables with each. (Breakfast, lunch, dinner with 2 healthy snacks)    8. Get proper rest 7-8 hours uninterrupted. When you get less than 6 hours, it triggers hunger by affecting your Grehlin:Leptin ratio and this results in weight gain. 9.  Watch your food portions. Green leafy vegetables should cover 1/2 of the plate, lean meat 1/4 of the plate, starchy vegetable 1/4 of the plate. Use smaller plates. 10.  Do not eat until you are full. Eat until you are no longer hungry. If you are not sure, try drinking a glass of water before getting your second serving of food. 11.  Do not weigh yourself daily. Wait until your next office visit. Use how you feel and  how your clothes fit as measurements of success. 12.  Address your spirituality to draw strength from above during your journey. Remember \"I am fearfully and wonderfully made. Marvelous in His eyes. \"    13.   Set realistic, appropriate and achievable weight loss goals:     RECOMMENDED TARGET WEIGHT LOSS:  Initial weight loss of 5-10% of your initial body weight achieved over 6 months or a decrease of 2 BMI units.     MINIMUM GOAL OF WEIGHT LOSS:  Reduce body weight and maintain a lower body weight. Prevent weight gain. RELATED WEBSITES:      Www.obesityaction. org  (and consider joining the Obesity Action Coalition for $25/year. The [address] mission is to elevated and empower those affected by obesity through education, advocacy and support. Quarterly journals included in membership fee. )    Www.Listia  www.Entigo     RELATED DIETS:  Dr. Cabrera Galeana Weight loss challenge, Mediterranean diet, [address] [place] [address] Watchers, Paleo Diet (anti-inflammatory diet)        EXERCISE 150 MINUTES WEEKLY. THIS CAN BE ACHIEVED BY WORKING OUT OR WALKING A MINIMUM OF 30 MINUTES FOR 5 DAYS WEEKLY. YOU CAN EXERCISE IN INCREMENTS OF 10-15 MINUTES UP TO 3 TIMES A DAY. Consider performing \"brainless exercise. \"  Choose your favorite tv program.  Five minutes before and for 5 minutes after the tv program, stretch your body. While the program is on, walk in place watching the show. When commercials come on, rest or walk around the house to do other things. When the program begins, return to walking in place. When you are able keep walking during the commercials and add light weights to your ankles or hands. By the end of the show, you would have walked 30 minutes. If you need shorter spurts of exercise, walk during the commercials and rest during the show. Drink to glasses of water prior to any exercise to prevent dehydration and to improve the results of the work out. Your RESTING HEART RATE is the number of times your heart beats per minute when you are not exerting yourself. The more fit you are, the lower your resting heart rate will be. Your MAXIMUM HEART RATE is the number of times per minute your heart pumps when it is working at 100% capacity. NEVER EXERCISE AT YOUR MAXIMUM HEART RATE.     MAX HEART RATE = 220 - your age    For example, in a 48year old, the maximum heart rate is 220-50 = 170 beats per minute    Your TARGET HEART RATE is the number of beats per minute our heart should pump during aerobic exercise. Reaching your target heart rate indicates that your body is receiving maximum cardiovascular and fat burning benefits. If you are fit, your TARGET HEART RATE = 70-85% of your maximum Heart rate      For a 48year old with vigorous intensity physical activity,         Target heart rate= 170 bpm x .70 = 119 bpm     Target heart rate = 170 bpm x .85=  145 bpm        Therefore, your target heart rate during physical activity is 119-145      If you are not fit, TARGET HEART RATE = 50-70% of your maximum Heart rate    MODERATE CONSISTENT AEROBIC EXERCISE OR WALKING FOR AT LEAST 150 MINUTES WEEKLY IS AN ESSENTIAL PART OF ANY WEIGHT LOSS OF WEIGHT MAINTENANCE PROGRAM.     During WEIGHT LOSS PHASE, at least 75% of your exercise needs to be walking or moderate aerobic activity and 25% or 0% can be Isometric or Resistance type exercises (the latter can be deferred to the weight maintenance phase.)     During WEIGHT MAINTENANCE PHASE, at least 50% of your exercise needs to be aerobic and 50% Isometric or Resistance type exercises. BENEFITS OF MODERATE INTENSITY EXERCISE MOST DAYS OF THE WEEK:     1. Insulin Resistance improves 30-85%   2. Abdominal Fat decreases by 30%   3. Inflammatory Markers decrease by 30% (therefore pain decreases)   4. Systolic and Diastolic Blood Pressure decrease by 4 mmHg   5. HDL improves by 5%   6. Triglycerides decrease by 15%   7. Shift from small dense LDL to large dense LDL (therefore decrease Insulin Resistance)   8. Decrease coagulability                  Starting a Weight Loss Plan: Care Instructions  Your Care Instructions    If you are thinking about losing weight, it can be hard to know where to start. Your doctor can help you set up a weight loss plan that best meets your needs.  You may want to take a class on nutrition or exercise, or join a weight loss support group. If you have questions about how to make changes to your eating or exercise habits, ask your doctor about seeing a registered dietitian or an exercise specialist.  It can be a big challenge to lose weight. But you do not have to make huge changes at once. Make small changes, and stick with them. When those changes become habit, add a few more changes. If you do not think you are ready to make changes right now, try to pick a date in the future. Make an appointment to see your doctor to discuss whether the time is right for you to start a plan. Follow-up care is a key part of your treatment and safety. Be sure to make and go to all appointments, and call your doctor if you are having problems. It's also a good idea to know your test results and keep a list of the medicines you take. How can you care for yourself at home? · Set realistic goals. Many people expect to lose much more weight than is likely. A weight loss of 5% to 10% of your body weight may be enough to improve your health. · Get family and friends involved to provide support. Talk to them about why you are trying to lose weight, and ask them to help. They can help by participating in exercise and having meals with you, even if they may be eating something different. · Find what works best for you. If you do not have time or do not like to cook, a program that offers meal replacement bars or shakes may be better for you. Or if you like to prepare meals, finding a plan that includes daily menus and recipes may be best.  · Ask your doctor about other health professionals who can help you achieve your weight loss goals. ¨ A dietitian can help you make healthy changes in your diet.   ¨ An exercise specialist or  can help you develop a safe and effective exercise program.  ¨ A counselor or psychiatrist can help you cope with issues such as depression, anxiety, or family problems that can make it hard to focus on weight loss. · Consider joining a support group for people who are trying to lose weight. Your doctor can suggest groups in your area. Where can you learn more? Go to http://thania-chong.info/. Enter O323 in the search box to learn more about \"Starting a Weight Loss Plan: Care Instructions. \"  Current as of: October 13, 2016  Content Version: 11.4  © 2111-4369 [employer]. Care instructions adapted under license by [employer] (which disclaims liability or warranty for this information). If you have questions about a medical condition or this instruction, always ask your healthcare professional. [address] any warranty or liability for your use of this information.

## 2017-12-29 NOTE — MR AVS SNAPSHOT
Visit Information Date & Time Provider Department Dept. Phone Encounter #  
 12/29/2017 10:00 AM Connie Robison DO [facility] [phone] [id number] Follow-up Instructions Return in about 6 months (around 6/29/2018) for diabetes, weight. Upcoming Health Maintenance Date Due  
 EYE EXAM RETINAL OR DILATED Q1 4/19/2018* FOBT Q 1 YEAR AGE 50-75 4/20/2018* HEMOGLOBIN A1C Q6M 6/29/2018 LIPID PANEL Q1 10/20/2018 PAP AKA CERVICAL CYTOLOGY 11/9/2018 FOOT EXAM Q1 12/29/2018 MICROALBUMIN Q1 12/29/2018 DTaP/Tdap/Td series (2 - Td) 6/29/2027 *Topic was postponed. The date shown is not the original due date. Allergies as of 12/29/2017  Review Complete On: 12/29/2017 By: Connie Robison DO No Known Allergies Current Immunizations  Reviewed on 12/29/2017 Name Date Influenza Vaccine 10/17/2017, 11/9/2015 Influenza Vaccine Split 11/3/2011 Influenza Vaccine Whole 10/1/2009 Reviewed by Connie Robison DO on 12/29/2017 at 10:56 AM  
You Were Diagnosed With   
  
 Codes Comments Controlled type 2 diabetes mellitus without complication, without long-term current use of insulin ([facility]    -  Primary ICD-10-CM: E11.9 ICD-9-CM: 250.00 Mixed dyslipidemia     ICD-10-CM: E78.2 ICD-9-CM: 272.2 worse due to diet changes Vitamin D deficiency     ICD-10-CM: E55.9 ICD-9-CM: 268.9 Personal history of breast cancer     ICD-10-CM: Z85.3 ICD-9-CM: V10.3 L, stable Obesity, Class I, BMI 30-34.9     ICD-10-CM: E66.9 ICD-9-CM: 278.00 Glaucoma suspect of both eyes     ICD-10-CM: H40.003 ICD-9-CM: 365.00 Family history of glaucoma in father     ICD-10-CM: Z80.65 ICD-9-CM: V19.11 Family history of colon cancer     ICD-10-CM: Z80.0 ICD-9-CM: V16.0 Family history of diabetes mellitus (DM)     ICD-10-CM: Z83.3 ICD-9-CM: V18.0 Family history of breast cancer     ICD-10-CM: Z80.3 ICD-9-CM: V16.3 Family history of heart disease     ICD-10-CM: Z82.49 
ICD-9-CM: V17.49 Mixed dyslipidemia     ICD-10-CM: E78.2 ICD-9-CM: 272.2 Microalbuminuria     ICD-10-CM: R80.9 ICD-9-CM: 791.0 Colon cancer screening     ICD-10-CM: Z12.11 ICD-9-CM: V76.51 Glucosuria     ICD-10-CM: R81 
ICD-9-CM: 791.5 due to Invokamet, stable Vitals BP Pulse Temp Resp Height(growth percentile) Weight(growth percentile) 116/79 (BP 1 Location: Right arm, BP Patient Position: Sitting) 68 97.5 °F (36.4 °C) (Oral) 18 5' 2.01\" (1.575 m) 168 lb 3.2 oz (76.3 kg) SpO2 BMI OB Status Smoking Status 99% 30.75 kg/m2 Menopause Never Smoker Vitals History BMI and BSA Data Body Mass Index Body Surface Area 30.75 kg/m 2 1.83 m 2 Preferred Pharmacy Pharmacy Name Phone [address] [facility] [phone] Your Updated Medication List  
  
   
This list is accurate as of: 12/29/17 10:58 AM.  Always use your most recent med list.  
  
  
  
  
 enalapril 2.5 mg tablet Commonly known as:  Sheridan Burow Take 1 Tab by mouth daily. Indications: kidney protection  
  
 ibuprofen 600 mg tablet Commonly known as:  MOTRIN Take 1 Tab by mouth every six (6) hours as needed for Pain. INVOKAMET  mg Tab Generic drug:  canagliflozin-metFORMIN  
TAKE 1 TABLET BY MOUTH EVERY DAY  
  
 rosuvastatin 20 mg tablet Commonly known as:  CRESTOR Take 1 Tab by mouth nightly. Indications: hyperlipidemia VITAMIN D3 1,000 unit Cap Generic drug:  cholecalciferol Take  by mouth daily. Prescriptions Printed Refills  
 rosuvastatin (CRESTOR) 20 mg tablet 3 Sig: Take 1 Tab by mouth nightly. Indications: hyperlipidemia Class: Print Route: Oral  
 enalapril (VASOTEC) 2.5 mg tablet 3 Sig: Take 1 Tab by mouth daily. Indications: kidney protection Class: Print  Route: Oral  
  
 We Performed the Following AMB POC HEMOGLOBIN A1C [78276 CPT(R)]  DIABETES FOOT EXAM [HM7 Custom] MICROALBUMIN, UR, RAND W/ MICROALBUMIN/CREA RATIO [id number] CPT(R)] REFERRAL TO GASTROENTEROLOGY [TZW35 Custom] Follow-up Instructions Return in about 6 months (around 6/29/2018) for diabetes, weight. Referral Information Referral ID Referred By Referred To  
  
 [id number] Khris Flores [address] [place] [address] Phone: [phone] Fax: [phone] Visits Status Start Date End Date 1 New Request 12/29/17 12/29/18 If your referral has a status of pending review or denied, additional information will be sent to support the outcome of this decision. Patient Instructions Learning About High Cholesterol What is high cholesterol? Cholesterol is a type of fat in your blood. It is needed for many body functions, such as making new cells. Cholesterol is made by your body. It also comes from food you eat. If you have too much cholesterol, it starts to build up in your arteries. This is called hardening of the arteries, or atherosclerosis. High cholesterol raises your risk of a heart attack and stroke. There are different types of cholesterol. LDL is the \"bad\" cholesterol. High LDL can raise your risk for heart disease, heart attack, and stroke. HDL is the \"good\" cholesterol. High HDL is linked with a lower risk for heart disease, heart attack, and stroke. Your cholesterol levels help your doctor find out your risk for having a heart attack or stroke. How can you prevent high cholesterol? A heart-healthy lifestyle can help you prevent high cholesterol. This lifestyle helps lower your risk for a heart attack and stroke. · Eat heart-healthy foods.  
¨ Eat fruits, vegetables, whole grains (like oatmeal), dried beans and peas, nuts and seeds, soy products (like tofu), and fat-free or low-fat dairy products. ¨ Replace butter, margarine, and hydrogenated or partially hydrogenated oils with olive and canola oils. (Canola oil margarine without trans fat is fine.) ¨ Replace red meat with fish, poultry, and soy protein (like tofu). ¨ Limit processed and packaged foods like chips, crackers, and cookies. · Be active. Exercise can improve your cholesterol level. Get at least 30 minutes of exercise on most days of the week. Walking is a good choice. You also may want to do other activities, such as running, swimming, cycling, or playing tennis or team sports. · Stay at a healthy weight. Lose weight if you need to. · Don't smoke. If you need help quitting, talk to your doctor about stop-smoking programs and medicines. These can increase your chances of quitting for good. How is high cholesterol treated? The goal of treatment is to reduce your chances of having a heart attack or stroke. The goal is not to lower your cholesterol numbers only. · You may make lifestyle changes, such as eating healthy foods, not smoking, losing weight, and being more active. · You may have to take medicine. Follow-up care is a key part of your treatment and safety. Be sure to make and go to all appointments, and call your doctor if you are having problems. It's also a good idea to know your test results and keep a list of the medicines you take. Where can you learn more? Go to http://thania-chong.info/. Enter P956 in the search box to learn more about \"Learning About High Cholesterol. \" Current as of: September 21, 2016 Content Version: 11.4 © 1357-1939 [employer]. Care instructions adapted under license by [employer] (which disclaims liability or warranty for this information).  If you have questions about a medical condition or this instruction, always ask your healthcare professional. [employer] disclaims any warranty or liability for your use of this information. High Cholesterol: Care Instructions Your Care Instructions Cholesterol is a type of fat in your blood. It is needed for many body functions, such as making new cells. Cholesterol is made by your body. It also comes from food you eat. High cholesterol means that you have too much of the fat in your blood. This raises your risk of a heart attack and stroke. LDL and HDL are part of your total cholesterol. LDL is the \"bad\" cholesterol. High LDL can raise your risk for heart disease, heart attack, and stroke. HDL is the \"good\" cholesterol. It helps clear bad cholesterol from the body. High HDL is linked with a lower risk of heart disease, heart attack, and stroke. Your cholesterol levels help your doctor find out your risk for having a heart attack or stroke. You and your doctor can talk about whether you need to lower your risk and what treatment is best for you. A heart-healthy lifestyle along with medicines can help lower your cholesterol and your risk. The way you choose to lower your risk will depend on how high your risk is for heart attack and stroke. It will also depend on how you feel about taking medicines. Follow-up care is a key part of your treatment and safety. Be sure to make and go to all appointments, and call your doctor if you are having problems. It's also a good idea to know your test results and keep a list of the medicines you take. How can you care for yourself at home? · Eat a variety of foods every day. Good choices include fruits, vegetables, whole grains (like oatmeal), dried beans and peas, nuts and seeds, soy products (like tofu), and fat-free or low-fat dairy products. · Replace butter, margarine, and hydrogenated or partially hydrogenated oils with olive and canola oils. (Canola oil margarine without trans fat is fine.) · Replace red meat with fish, poultry, and soy protein (like tofu). · Limit processed and packaged foods like chips, crackers, and cookies. · Bake, broil, or steam foods. Don't milton them. · Be physically active. Get at least 30 minutes of exercise on most days of the week. Walking is a good choice. You also may want to do other activities, such as running, swimming, cycling, or playing tennis or team sports. · Stay at a healthy weight or lose weight by making the changes in eating and physical activity listed above. Losing just a small amount of weight, even 5 to 10 pounds, can reduce your risk for having a heart attack or stroke. · Do not smoke. When should you call for help? Watch closely for changes in your health, and be sure to contact your doctor if: 
? · You need help making lifestyle changes. ? · You have questions about your medicine. Where can you learn more? Go to http://thaniaJRD Communicationchong.info/. Enter Z574 in the search box to learn more about \"High Cholesterol: Care Instructions. \" Current as of: September 21, 2016 Content Version: 11.4 © 9929-0392 [employer]ol Technologies. Care instructions adapted under license by [employer] (which disclaims liability or warranty for this information). If you have questions about a medical condition or this instruction, always ask your healthcare professional. [address] any warranty or liability for your use of this information. Learning About Diabetes Food Guidelines Your Care Instructions Meal planning is important to manage diabetes. It helps keep your blood sugar at a target level (which you set with your doctor). You don't have to eat special foods. You can eat what your family eats, including sweets once in a while. But you do have to pay attention to how often you eat and how much you eat of certain foods. You may want to work with a dietitian or a certified diabetes educator (CDE) to help you plan meals and snacks.  A dietitian or CDE can also help you lose weight if that is one of your goals. What should you know about eating carbs? Managing the amount of carbohydrate (carbs) you eat is an important part of healthy meals when you have diabetes. Carbohydrate is found in many foods. · Learn which foods have carbs. And learn the amounts of carbs in different foods. ¨ Bread, cereal, pasta, and rice have about 15 grams of carbs in a serving. A serving is 1 slice of bread (1 ounce), ½ cup of cooked cereal, or 1/3 cup of cooked pasta or rice. ¨ Fruits have 15 grams of carbs in a serving. A serving is 1 small fresh fruit, such as an apple or orange; ½ of a banana; ½ cup of cooked or canned fruit; ½ cup of fruit juice; 1 cup of melon or raspberries; or 2 tablespoons of dried fruit. ¨ Milk and no-sugar-added yogurt have 15 grams of carbs in a serving. A serving is 1 cup of milk or 2/3 cup of no-sugar-added yogurt. ¨ Starchy vegetables have 15 grams of carbs in a serving. A serving is ½ cup of mashed potatoes or sweet potato; 1 cup winter squash; ½ of a small baked potato; ½ cup of cooked beans; or ½ cup cooked corn or green peas. · Learn how much carbs to eat each day and at each meal. A dietitian or CDE can teach you how to keep track of the amount of carbs you eat. This is called carbohydrate counting. · If you are not sure how to count carbohydrate grams, use the Plate Method to plan meals. It is a good, quick way to make sure that you have a balanced meal. It also helps you spread carbs throughout the day. ¨ Divide your plate by types of foods. Put non-starchy vegetables on half the plate, meat or other protein food on one-quarter of the plate, and a grain or starchy vegetable in the final quarter of the plate.  To this you can add a small piece of fruit and 1 cup of milk or yogurt, depending on how many carbs you are supposed to eat at a meal. 
· Try to eat about the same amount of carbs at each meal. Do not \"save up\" your daily allowance of carbs to eat at one meal. 
· Proteins have very little or no carbs per serving. Examples of proteins are beef, chicken, turkey, fish, eggs, tofu, cheese, cottage cheese, and peanut butter. A serving size of meat is 3 ounces, which is about the size of a deck of cards. Examples of meat substitute serving sizes (equal to 1 ounce of meat) are 1/4 cup of cottage cheese, 1 egg, 1 tablespoon of peanut butter, and ½ cup of tofu. How can you eat out and still eat healthy? · Learn to estimate the serving sizes of foods that have carbohydrate. If you measure food at home, it will be easier to estimate the amount in a serving of restaurant food. · If the meal you order has too much carbohydrate (such as potatoes, corn, or baked beans), ask to have a low-carbohydrate food instead. Ask for a salad or green vegetables. · If you use insulin, check your blood sugar before and after eating out to help you plan how much to eat in the future. · If you eat more carbohydrate at a meal than you had planned, take a walk or do other exercise. This will help lower your blood sugar. What else should you know? · Limit saturated fat, such as the fat from meat and dairy products. This is a healthy choice because people who have diabetes are at higher risk of heart disease. So choose lean cuts of meat and nonfat or low-fat dairy products. Use olive or canola oil instead of butter or shortening when cooking. · Don't skip meals. Your blood sugar may drop too low if you skip meals and take insulin or certain medicines for diabetes. · Check with your doctor before you drink alcohol. Alcohol can cause your blood sugar to drop too low. Alcohol can also cause a bad reaction if you take certain diabetes medicines. Follow-up care is a key part of your treatment and safety.  Be sure to make and go to all appointments, and call your doctor if you are having problems. It's also a good idea to know your test results and keep a list of the medicines you take. Where can you learn more? Go to http://thania-chong.info/. Enter H219 in the search box to learn more about \"Learning About Diabetes Food Guidelines. \" Current as of: March 13, 2017 Content Version: 11.4 © 8829-8218 Tobii Technology. Care instructions adapted under license by [employer] (which disclaims liability or warranty for this information). If you have questions about a medical condition or this instruction, always ask your healthcare professional. [address] any warranty or liability for your use of this information. Starting a Weight Loss Plan: Care Instructions Your Care Instructions If you are thinking about losing weight, it can be hard to know where to start. Your doctor can help you set up a weight loss plan that best meets your needs. You may want to take a class on nutrition or exercise, or join a weight loss support group. If you have questions about how to make changes to your eating or exercise habits, ask your doctor about seeing a registered dietitian or an exercise specialist. 
It can be a big challenge to lose weight. But you do not have to make huge changes at once. Make small changes, and stick with them. When those changes become habit, add a few more changes. If you do not think you are ready to make changes right now, try to pick a date in the future. Make an appointment to see your doctor to discuss whether the time is right for you to start a plan. Follow-up care is a key part of your treatment and safety. Be sure to make and go to all appointments, and call your doctor if you are having problems. It's also a good idea to know your test results and keep a list of the medicines you take. How can you care for yourself at home? · Set realistic goals.  Many people expect to lose much more weight than is likely. A weight loss of 5% to 10% of your body weight may be enough to improve your health. · Get family and friends involved to provide support. Talk to them about why you are trying to lose weight, and ask them to help. They can help by participating in exercise and having meals with you, even if they may be eating something different. · Find what works best for you. If you do not have time or do not like to cook, a program that offers meal replacement bars or shakes may be better for you. Or if you like to prepare meals, finding a plan that includes daily menus and recipes may be best. 
· Ask your doctor about other health professionals who can help you achieve your weight loss goals. ¨ A dietitian can help you make healthy changes in your diet. ¨ An exercise specialist or  can help you develop a safe and effective exercise program. 
¨ A counselor or psychiatrist can help you cope with issues such as depression, anxiety, or family problems that can make it hard to focus on weight loss. · Consider joining a support group for people who are trying to lose weight. Your doctor can suggest groups in your area. Where can you learn more? Go to http://thania-chong.info/. Enter E895 in the search box to learn more about \"Starting a Weight Loss Plan: Care Instructions. \" Current as of: October 13, 2016 Content Version: 11.4 © 0665-3923 Healthwise, Incorporated. Care instructions adapted under license by [employer] (which disclaims liability or warranty for this information). If you have questions about a medical condition or this instruction, always ask your healthcare professional. [place] [address] any warranty or liability for your use of this information. WEIGHT LOSS PLAN 1. Read food labels and count calories. Goal 7187-5340 calories daily for women and 8632-0021 calories daily for men.   Achieve this by decreasing caloric intake by 500 calories by weekly increments. NOTE:  1 pound = 3500 calories! Www.loseit. EPIS Www.mySenergen Devicesnesspal.EPIS Www.Ambature Www.Thinknum. gov Weight watchers mobile Calories needed to lose 1-2 pounds a week = 10 x your weight in pounds 2. Increase water intake. Per [facility], it is important to drink 1/2 your body weight in ounces of water daily. Decrease your water consumption 2-3 hours before bedtime to prevent sleep disturbance from frequent urination. 3.  Decrease sugary beverages. Each can or glass of soda increases your risk of obesity by 60%. Can lose 10 pounds in a month by avoiding any soda. 12 oz can of soda = 140 calories, 16 oz cup of sweet tea = 200 calories 16 oz orange juice = 200 calories, 10 oz apple juice = 150 calories 32 oz sports drink = 200 calories, 16 oz punch = 240 calories 3.5 oz alcohol = 100-150 calories 4. Avoid High Fructose Corn Syrup products. This ingredient makes products highly addictive! 5. Exercise 30 minutes daily 5 days weekly, minimally. If you burn 3500 calories that equals a pound! Use a pedometer to count steps. Visit www. GameFly. EPIS for a free pedometer and diet recommendations. Your maximum heart rate = 220 - your age Never exercise at your maximum heart rate. See handout for target heart rate. 6.  Decrease carbohydrates (white bread, pasta, rice, potatoes, sweet foods and sweet drinks like soda, tea, coffee, juice and sports drinks). Increase fiber and protein. Goal:   calories daily of carbohydrates Try CHROMIUM PICONATE 200 MG THREE TIMES DAILY,  
 to decrease Premenstrual carbohydrate cravings. 7.  Eat 3-5 small meals daily, include lots of protein (beans/legumes, nuts, lean meat, eggs) and green vegetables with each. (Breakfast, lunch, dinner with 2 healthy snacks) 8.  Get proper rest 7-8 hours uninterrupted. When you get less than 6 hours, it triggers hunger by affecting your Grehlin:Leptin ratio and this results in weight gain. 9.  Watch your food portions. Green leafy vegetables should cover 1/2 of the plate, lean meat 1/4 of the plate, starchy vegetable 1/4 of the plate. Use smaller plates. 10.  Do not eat until you are full. Eat until you are no longer hungry. If you are not sure, try drinking a glass of water before getting your second serving of food. 11.  Do not weigh yourself daily. Wait until your next office visit. Use how you feel and  how your clothes fit as measurements of success. 12.  Address your spirituality to draw strength from above during your journey. Remember \"I am fearfully and wonderfully made. Marvelous in His eyes. \" 
 
13. Set realistic, appropriate and achievable weight loss goals: 
 
 RECOMMENDED TARGET WEIGHT LOSS:  Initial weight loss of 5-10% of your initial body weight achieved over 6 months or a decrease of 2 BMI units. MINIMUM GOAL OF WEIGHT LOSS:  Reduce body weight and maintain a lower body weight. Prevent weight gain. RELATED WEBSITES:     
Www.obesityaction. org 
(and consider joining the Obesity Action Coalition for $25/year. The [address] mission is to elevated and empower those affected by obesity through education, advocacy and support. Quarterly journals included in membership fee. ) Www.BetTech Gaming. Navigating Cancer 
www.Adku.Navigating Cancer RELATED DIETS:  Dr. Gómez Peak Weight loss challenge, Mediterranean diet, Henniker Diet, [employer] Watchers, Paleo Diet (anti-inflammatory diet) EXERCISE 150 MINUTES WEEKLY. THIS CAN BE ACHIEVED BY WORKING OUT OR WALKING A MINIMUM OF 30 MINUTES FOR 5 DAYS WEEKLY. YOU CAN EXERCISE IN INCREMENTS OF 10-15 MINUTES UP TO 3 TIMES A DAY. Consider performing \"brainless exercise. \"  Choose your favorite tv program.  Five minutes before and for 5 minutes after the tv program, stretch your body. While the program is on, walk in place watching the show. When commercials come on, rest or walk around the house to do other things. When the program begins, return to walking in place. When you are able keep walking during the commercials and add light weights to your ankles or hands. By the end of the show, you would have walked 30 minutes. If you need shorter spurts of exercise, walk during the commercials and rest during the show. Drink to glasses of water prior to any exercise to prevent dehydration and to improve the results of the work out. Your RESTING HEART RATE is the number of times your heart beats per minute when you are not exerting yourself. The more fit you are, the lower your resting heart rate will be. Your MAXIMUM HEART RATE is the number of times per minute your heart pumps when it is working at 100% capacity. NEVER EXERCISE AT YOUR MAXIMUM HEART RATE. MAX HEART RATE = 220 - your age For example, in a 48year old, the maximum heart rate is 220-50 = 170 beats per minute Your TARGET HEART RATE is the number of beats per minute our heart should pump during aerobic exercise. Reaching your target heart rate indicates that your body is receiving maximum cardiovascular and fat burning benefits. If you are fit, your TARGET HEART RATE = 70-85% of your maximum Heart rate For a 48year old with vigorous intensity physical activity, Target heart rate= 170 bpm x .70 = 119 bpm 
   Target heart rate = 170 bpm x .85=  145 bpm 
 
    Therefore, your target heart rate during physical activity is 119-145 If you are not fit, TARGET HEART RATE = 50-70% of your maximum Heart rate MODERATE CONSISTENT AEROBIC EXERCISE OR WALKING FOR AT LEAST 150 MINUTES WEEKLY IS AN ESSENTIAL PART OF ANY WEIGHT LOSS OF WEIGHT MAINTENANCE PROGRAM. During WEIGHT LOSS PHASE, at least 75% of your exercise needs to be walking or moderate aerobic activity and 25% or 0% can be Isometric or Resistance type exercises (the latter can be deferred to the weight maintenance phase.) During WEIGHT MAINTENANCE PHASE, at least 50% of your exercise needs to be aerobic and 50% Isometric or Resistance type exercises. BENEFITS OF MODERATE INTENSITY EXERCISE MOST DAYS OF THE WEEK: 
 
 1. Insulin Resistance improves 30-85% 2. Abdominal Fat decreases by 30% 3. Inflammatory Markers decrease by 30% (therefore pain decreases) 4. Systolic and Diastolic Blood Pressure decrease by 4 mmHg 5. HDL improves by 5% 6. Triglycerides decrease by 15% 7. Shift from small dense LDL to large dense LDL (therefore decrease Insulin Resistance) 8. Decrease coagulability Starting a Weight Loss Plan: Care Instructions Your Care Instructions If you are thinking about losing weight, it can be hard to know where to start. Your doctor can help you set up a weight loss plan that best meets your needs. You may want to take a class on nutrition or exercise, or join a weight loss support group. If you have questions about how to make changes to your eating or exercise habits, ask your doctor about seeing a registered dietitian or an exercise specialist. 
It can be a big challenge to lose weight. But you do not have to make huge changes at once. Make small changes, and stick with them. When those changes become habit, add a few more changes. If you do not think you are ready to make changes right now, try to pick a date in the future. Make an appointment to see your doctor to discuss whether the time is right for you to start a plan. Follow-up care is a key part of your treatment and safety. Be sure to make and go to all appointments, and call your doctor if you are having problems.  It's also a good idea to know your test results and keep a list of the medicines you take. How can you care for yourself at home? · Set realistic goals. Many people expect to lose much more weight than is likely. A weight loss of 5% to 10% of your body weight may be enough to improve your health. · Get family and friends involved to provide support. Talk to them about why you are trying to lose weight, and ask them to help. They can help by participating in exercise and having meals with you, even if they may be eating something different. · Find what works best for you. If you do not have time or do not like to cook, a program that offers meal replacement bars or shakes may be better for you. Or if you like to prepare meals, finding a plan that includes daily menus and recipes may be best. 
· Ask your doctor about other health professionals who can help you achieve your weight loss goals. ¨ A dietitian can help you make healthy changes in your diet. ¨ An exercise specialist or  can help you develop a safe and effective exercise program. 
¨ A counselor or psychiatrist can help you cope with issues such as depression, anxiety, or family problems that can make it hard to focus on weight loss. · Consider joining a support group for people who are trying to lose weight. Your doctor can suggest groups in your area. Where can you learn more? Go to http://thania-chong.info/. Enter Z887 in the search box to learn more about \"Starting a Weight Loss Plan: Care Instructions. \" Current as of: October 13, 2016 Content Version: 11.4 © 9168-4755 [employer]. Care instructions adapted under license by [employer] (which disclaims liability or warranty for this information). If you have questions about a medical condition or this instruction, always ask your healthcare professional. [address] any warranty or liability for your use of this information. Introducing [facility]! [facility] introduces Express [employer] patient portal. Now you can access parts of your medical record, email your doctor's office, and request medication refills online. 1. In your internet browser, go to [email or web address] 2. Click on the First Time User? Click Here link in the Sign In box. You will see the New Member Sign Up page. 3. Enter your Express Fit Access Code exactly as it appears below. You will not need to use this code after youve completed the sign-up process. If you do not sign up before the expiration date, you must request a new code. · Express Fit Access Code: [email or web address] Expires: 3/29/2018 10:58 AM 
 
4. Enter the last four digits of your Social Security Number (xxxx) and Date of Birth (mm/dd/yyyy) as indicated and click Submit. You will be taken to the next sign-up page. 5. Create a Express Fit ID. This will be your Express Fit login ID and cannot be changed, so think of one that is secure and easy to remember. 6. Create a Express Fit password. You can change your password at any time. 7. Enter your Password Reset Question and Answer. This can be used at a later time if you forget your password. 8. Enter your e-mail address. You will receive e-mail notification when new information is available in [address] 9. Click Sign Up. You can now view and download portions of your medical record. 10. Click the Download Summary menu link to download a portable copy of your medical information. If you have questions, please visit the Frequently Asked Questions section of the Express Fit website. Remember, Express Fit is NOT to be used for urgent needs. For medical emergencies, dial 911. Now available from your iPhone and Android! Please provide this summary of care documentation to your next provider. Your primary care clinician is listed as Benson Puentes. If you have any questions after today's visit, please call [phone].

## 2017-12-29 NOTE — ACP (ADVANCE CARE PLANNING)
Re-discussed ACP with patient. Declines another Right to Decide handbook. Declines referral to [facility] team at this time. Patient will bring document to her next office visit.

## 2017-12-29 NOTE — LETTER
[facility] introduces [employer]iously patient portal. Now you can access parts of your medical record, email your doctor's office, and request medication refills online. 1. In your internet browser, go to [email or web address] 
2. Click on the First Time User? Click Here link in the Sign In box. You will see the New Member Sign Up page. 3. Enter your Curiously Access Code exactly as it appears below. You will not need to use this code after youve completed the sign-up process. If you do not sign up before the expiration date, you must request a new code. · Curiously Access Code:-[email or web address] · Expires: 3/29/2018 10:58 AM 
 
4. Enter the last four digits of your Social Security Number (xxxx) and Date of Birth (mm/dd/yyyy) as indicated and click Submit. You will be taken to the next sign-up page. 5. Create a Curiously ID. This will be your Curiously login ID and cannot be changed, so think of one that is secure and easy to remember. 6. Create a Curiously password. You can change your password at any time. 7. Enter your Password Reset Question and Answer. This can be used at a later time if you forget your password. 8. Enter your e-mail address. You will receive e-mail notification when new information is available in [address] 9. Click Sign Up. You can now view and download portions of your medical record. 10. Click the Download Summary menu link to download a portable copy of your medical information. If you have questions, please visit the Frequently Asked Questions section of the Curiously website. Remember, Curiously is NOT to be used for urgent needs. For medical emergencies, dial 911. Now available from your iPhone and Android!

## 2018-03-01 DIAGNOSIS — E11.9 CONTROLLED TYPE 2 DIABETES MELLITUS WITHOUT COMPLICATION, WITHOUT LONG-TERM CURRENT USE OF INSULIN (HCC): ICD-10-CM

## 2018-03-01 NOTE — TELEPHONE ENCOUNTER
From: Nila Plascencia  To: Qiana Arce DO  Sent: 3/1/2018 6:42 AM EST  Subject: Medication Renewal Request    Original authorizing provider: DO Nila Galvez would like a refill of the following medications:  INVOKAMET  mg tab Qiana Arce DO]    Preferred pharmacy: Other -  paper at office    Comment:

## 2018-03-01 NOTE — TELEPHONE ENCOUNTER
PCP: Qiana Arce DO    Last appt: 12/29/2017  Future Appointments  Date Time Provider [place]   6/29/2018 9:00 AM Qiana Arce DO [facility]       Requested Prescriptions     Pending Prescriptions Disp Refills    canagliflozin-metFORMIN (INVOKAMET)  mg tab 30 Tab 11     Lab Results   Component Value Date/Time    Sodium 140 10/20/2017 08:20 AM    Potassium 4.5 10/20/2017 08:20 AM    Chloride 98 10/20/2017 08:20 AM    CO2 24 10/20/2017 08:20 AM    Anion gap 8 01/22/2010 08:30 AM    Glucose 125 (H) 10/20/2017 08:20 AM    BUN 23 10/20/2017 08:20 AM    Creatinine 0.54 (L) 10/20/2017 08:20 AM    BUN/Creatinine ratio 43 (H) 10/20/2017 08:20 AM    GFR est  10/20/2017 08:20 AM    GFR est non- 10/20/2017 08:20 AM    Calcium 9.8 10/20/2017 08:20 AM     Lab Results   Component Value Date/Time    Hemoglobin A1c 6.0 (H) 06/23/2017 08:56 AM    Hemoglobin A1c (POC) 6.0 12/29/2017 10:15 AM     Lab Results   Component Value Date/Time    Cholesterol, total 290 (H) 10/20/2017 08:20 AM    HDL Cholesterol 44 10/20/2017 08:20 AM    LDL, calculated 203 (H) 10/20/2017 08:20 AM    VLDL, calculated 43 (H) 10/20/2017 08:20 AM    Triglyceride 214 (H) 10/20/2017 08:20 AM    CHOL/HDL Ratio 6.5 (H) 01/22/2010 08:30 AM     Lab Results   Component Value Date/Time    TSH 2.600 06/23/2017 08:56 AM

## 2018-06-10 DIAGNOSIS — E11.9 CONTROLLED TYPE 2 DIABETES MELLITUS WITHOUT COMPLICATION, WITHOUT LONG-TERM CURRENT USE OF INSULIN (HCC): ICD-10-CM

## 2018-06-12 DIAGNOSIS — E55.9 VITAMIN D DEFICIENCY: ICD-10-CM

## 2018-06-12 DIAGNOSIS — E11.9 CONTROLLED TYPE 2 DIABETES MELLITUS WITHOUT COMPLICATION, WITHOUT LONG-TERM CURRENT USE OF INSULIN (HCC): Primary | ICD-10-CM

## 2018-06-12 RX ORDER — CANAGLIFLOZIN AND METFORMIN HYDROCHLORIDE 50; 500 MG/1; MG/1
TABLET, FILM COATED ORAL
Qty: 30 TAB | Refills: 2 | Status: SHIPPED | OUTPATIENT
Start: 2018-06-12 | End: 2018-09-10 | Stop reason: SDUPTHER

## 2018-06-12 NOTE — TELEPHONE ENCOUNTER
----- Message from Elidia Baker sent at 6/12/2018 10:30 AM EDT -----  Regarding: Dr. Apollo Moore Telephone  Patient would like a call back regarding her prescriptions and would like to see if she is suppose to get lab work done for her appt on Friday 6/29/18.  Contact is [phone]

## 2018-06-12 NOTE — TELEPHONE ENCOUNTER
Rx has been sent and fasting labs ordered. Please have labs done at least 1 week before appointment with Dr. Darren Caldwell.

## 2018-06-19 ENCOUNTER — DOCUMENTATION ONLY (OUTPATIENT)
Dept: FAMILY MEDICINE CLINIC | Age: 53
End: 2018-06-19

## 2018-06-19 NOTE — PROGRESS NOTES
PA for Invokamet done by writer via [email or web address] on Tuesday 06/19/2018. Writer notification that no further action needed due to medication being covered under patients benefit plan.

## 2018-06-25 ENCOUNTER — TELEPHONE (OUTPATIENT)
Dept: FAMILY MEDICINE CLINIC | Age: 53
End: 2018-06-25

## 2018-06-25 NOTE — TELEPHONE ENCOUNTER
Patient called in reference to her appointment Friday 6/29/18 and wanted to make sure her labs were in before changing the appointment , sorry I wasn't able to tell her exactly if the labs were in or not because I do see many and the patient wasn't exactly sure either.   [phone]

## 2018-06-29 ENCOUNTER — OFFICE VISIT (OUTPATIENT)
Dept: FAMILY MEDICINE CLINIC | Age: 53
End: 2018-06-29

## 2018-06-29 VITALS
DIASTOLIC BLOOD PRESSURE: 84 MMHG | RESPIRATION RATE: 15 BRPM | SYSTOLIC BLOOD PRESSURE: 132 MMHG | HEART RATE: 64 BPM | OXYGEN SATURATION: 98 % | BODY MASS INDEX: 32.2 KG/M2 | TEMPERATURE: 97.5 F | HEIGHT: 62 IN | WEIGHT: 175 LBS

## 2018-06-29 DIAGNOSIS — Z85.3 PERSONAL HISTORY OF BREAST CANCER: ICD-10-CM

## 2018-06-29 DIAGNOSIS — Z83.511 FAMILY HISTORY OF GLAUCOMA IN FATHER: ICD-10-CM

## 2018-06-29 DIAGNOSIS — R63.5 WEIGHT GAIN: ICD-10-CM

## 2018-06-29 DIAGNOSIS — Z83.3 FAMILY HISTORY OF DIABETES MELLITUS (DM): ICD-10-CM

## 2018-06-29 DIAGNOSIS — E55.9 VITAMIN D DEFICIENCY: ICD-10-CM

## 2018-06-29 DIAGNOSIS — Z80.3 FAMILY HISTORY OF BREAST CANCER: ICD-10-CM

## 2018-06-29 DIAGNOSIS — Z80.0 FAMILY HISTORY OF COLON CANCER: ICD-10-CM

## 2018-06-29 DIAGNOSIS — E11.3292 CONTROLLED TYPE 2 DIABETES MELLITUS WITH LEFT EYE AFFECTED BY MILD NONPROLIFERATIVE RETINOPATHY WITHOUT MACULAR EDEMA, WITHOUT LONG-TERM CURRENT USE OF INSULIN (HCC): Primary | ICD-10-CM

## 2018-06-29 DIAGNOSIS — E78.2 MIXED DYSLIPIDEMIA: ICD-10-CM

## 2018-06-29 DIAGNOSIS — Z12.11 COLON CANCER SCREENING: ICD-10-CM

## 2018-06-29 DIAGNOSIS — Z82.49 FAMILY HISTORY OF HEART DISEASE: ICD-10-CM

## 2018-06-29 NOTE — PATIENT INSTRUCTIONS
Starting a Weight Loss Plan: Care Instructions  Your Care Instructions    If you are thinking about losing weight, it can be hard to know where to start. Your doctor can help you set up a weight loss plan that best meets your needs. You may want to take a class on nutrition or exercise, or join a weight loss support group. If you have questions about how to make changes to your eating or exercise habits, ask your doctor about seeing a registered dietitian or an exercise specialist.  It can be a big challenge to lose weight. But you do not have to make huge changes at once. Make small changes, and stick with them. When those changes become habit, add a few more changes. If you do not think you are ready to make changes right now, try to pick a date in the future. Make an appointment to see your doctor to discuss whether the time is right for you to start a plan. Follow-up care is a key part of your treatment and safety. Be sure to make and go to all appointments, and call your doctor if you are having problems. It's also a good idea to know your test results and keep a list of the medicines you take. How can you care for yourself at home? · Set realistic goals. Many people expect to lose much more weight than is likely. A weight loss of 5% to 10% of your body weight may be enough to improve your health. · Get family and friends involved to provide support. Talk to them about why you are trying to lose weight, and ask them to help. They can help by participating in exercise and having meals with you, even if they may be eating something different. · Find what works best for you. If you do not have time or do not like to cook, a program that offers meal replacement bars or shakes may be better for you. Or if you like to prepare meals, finding a plan that includes daily menus and recipes may be best.  · Ask your doctor about other health professionals who can help you achieve your weight loss goals.   ¨ A dietitian can help you make healthy changes in your diet. ¨ An exercise specialist or  can help you develop a safe and effective exercise program.  ¨ A counselor or psychiatrist can help you cope with issues such as depression, anxiety, or family problems that can make it hard to focus on weight loss. · Consider joining a support group for people who are trying to lose weight. Your doctor can suggest groups in your area. Where can you learn more? Go to http://thania-chong.info/. Enter Y931 in the search box to learn more about \"Starting a Weight Loss Plan: Care Instructions. \"  Current as of: October 13, 2016  Content Version: 11.4  © 5979-8106 [employer]. Care instructions adapted under license by [employer] (which disclaims liability or warranty for this information). If you have questions about a medical condition or this instruction, always ask your healthcare professional. [place] [address] any warranty or liability for your use of this information. Starting a Weight Loss Plan: Care Instructions  Your Care Instructions    If you are thinking about losing weight, it can be hard to know where to start. Your doctor can help you set up a weight loss plan that best meets your needs. You may want to take a class on nutrition or exercise, or join a weight loss support group. If you have questions about how to make changes to your eating or exercise habits, ask your doctor about seeing a registered dietitian or an exercise specialist.  It can be a big challenge to lose weight. But you do not have to make huge changes at once. Make small changes, and stick with them. When those changes become habit, add a few more changes. If you do not think you are ready to make changes right now, try to pick a date in the future. Make an appointment to see your doctor to discuss whether the time is right for you to start a plan.   Follow-up care is a key part of your treatment and safety. Be sure to make and go to all appointments, and call your doctor if you are having problems. It's also a good idea to know your test results and keep a list of the medicines you take. How can you care for yourself at home? · Set realistic goals. Many people expect to lose much more weight than is likely. A weight loss of 5% to 10% of your body weight may be enough to improve your health. · Get family and friends involved to provide support. Talk to them about why you are trying to lose weight, and ask them to help. They can help by participating in exercise and having meals with you, even if they may be eating something different. · Find what works best for you. If you do not have time or do not like to cook, a program that offers meal replacement bars or shakes may be better for you. Or if you like to prepare meals, finding a plan that includes daily menus and recipes may be best.  · Ask your doctor about other health professionals who can help you achieve your weight loss goals. ¨ A dietitian can help you make healthy changes in your diet. ¨ An exercise specialist or  can help you develop a safe and effective exercise program.  ¨ A counselor or psychiatrist can help you cope with issues such as depression, anxiety, or family problems that can make it hard to focus on weight loss. · Consider joining a support group for people who are trying to lose weight. Your doctor can suggest groups in your area. Where can you learn more? Go to http://thania-chong.info/. Enter Q159 in the search box to learn more about \"Starting a Weight Loss Plan: Care Instructions. \"  Current as of: October 13, 2016  Content Version: 11.4  © 6168-8059 [employer]. Care instructions adapted under license by [employer] (which disclaims liability or warranty for this information).  If you have questions about a medical condition or this instruction, always ask your healthcare professional. [address] any warranty or liability for your use of this information. Learning About Low-Carbohydrate Diets for Weight Loss  What is a low-carbohydrate diet? Low-carb diets avoid foods that are high in carbohydrate. These high-carb foods include pasta, bread, rice, cereal, fruits, and starchy vegetables. Instead, these diets usually have you eat foods that are high in fat and protein. Many people lose weight quickly on a low-carb diet. But the early weight loss is water. People on this diet often gain the weight back after they start eating carbs again. Not all diet plans are safe or work well. A lot of the evidence shows that low-carb diets aren't healthy. That's because these diets often don't include healthy foods like fruits and vegetables. Losing weight safely means balancing protein, fat, and carbs with every meal and snack. And low-carb diets don't always provide the vitamins, minerals, and fiber you need. If you have a serious medical condition, talk to your doctor before you try any diet. These conditions include kidney disease, heart disease, type 2 diabetes, high cholesterol, and high blood pressure. If you are pregnant, it may not be safe for your baby if you are on a low-carb diet. How can you lose weight safely? You might have heard that a diet plan helped another person lose weight. But that doesn't mean that it will work for you. It is very hard to stay on a diet that includes lots of big changes in your eating habits. If you want to get to a healthy weight and stay there, making healthy lifestyle changes will often work better than dieting. These steps can help. · Make a plan for change. Work with your doctor to create a plan that is right for you. · See a dietitian. He or she can show you how to make healthy changes in your eating habits. · Manage stress.  If you have a lot of stress in your life, it can be hard to focus on making healthy changes to your daily habits. · Track your food and activity. You are likely to do better at losing weight if you keep track of what you eat and what you do. Follow-up care is a key part of your treatment and safety. Be sure to make and go to all appointments, and call your doctor if you are having problems. It's also a good idea to know your test results and keep a list of the medicines you take. Where can you learn more? Go to http://thania-chong.info/. Enter A121 in the search box to learn more about \"Learning About Low-Carbohydrate Diets for Weight Loss. \"  Current as of: May 12, 2017  Content Version: 11.4  © 1847-0443 [employer]. Care instructions adapted under license by [employer] (which disclaims liability or warranty for this information). If you have questions about a medical condition or this instruction, always ask your healthcare professional. [place] [address] any warranty or liability for your use of this information. Recommend AHA new dietary guidelines to improve weight, cardiovascular and general health. Limit daily added sugar consumption to 6 tsp/25 grams/100 blanka/daily for women and 9 tsp/37 grams/150 blanka/daily for men. 1 tsp= 4 g    Nutrition Tips for Diabetes: After Your Visit  Your Care Instructions  A healthy diet is important to manage diabetes. It helps you lose weight (if you need to) and keep it off. It gives you the nutrition and energy your body needs and helps prevent heart disease. But a diet for diabetes does not mean that you have to eat special foods. You can eat what your family eats, including occasional sweets and other favorites. But you do have to pay attention to how often you eat and how much you eat of certain foods. The right plan for you will give you meals that help you keep your blood sugar at healthy levels.   Try to eat a variety of foods and to spread carbohydrate throughout the day. Carbohydrate raises blood sugar higher and more quickly than any other nutrient does. Carbohydrate is found in sugar, breads and cereals, fruit, starchy vegetables such as potatoes and corn, and milk and yogurt. You may want to work with a dietitian or diabetes educator to help you plan meals and snacks. A dietitian or diabetes educator also can help you lose weight if that is one of your goals. The following tips can help you enjoy your meals and stay healthy. Follow-up care is a key part of your treatment and safety. Be sure to make and go to all appointments, and call your doctor if you are having problems. Its also a good idea to know your test results and keep a list of the medicines you take. How can you care for yourself at home? · Learn which foods have carbohydrate and how much carbohydrate to eat. A dietitian or diabetes educator can help you learn to keep track of how much carbohydrate you eat. · Spread carbohydrate throughout the day. Eat some carbohydrate at all meals, but do not eat too much at any one time. · Plan meals to include food from all the food groups. These are the food groups and some example portion sizes:  ¨ Grains: 1 slice of bread (1 ounce), ½ cup of cooked cereal, and 1/3 cup of cooked pasta or rice. These have about 15 grams of carbohydrate in a serving. Choose whole grains such as whole wheat bread or crackers, oatmeal, and brown rice more often than refined grains. ¨ Fruit: 1 small fresh fruit, such as an apple or orange; ½ of a banana; ½ cup of chopped, cooked, or canned fruit; ½ cup of fruit juice; 1 cup of melon or raspberries; and 2 tablespoons of dried fruit. These have about 15 grams of carbohydrate in a serving. ¨ Dairy: 1 cup of nonfat or low-fat milk and 2/3 cup of plain yogurt. These have about 15 grams of carbohydrate in a serving.   ¨ Protein foods: Beef, chicken, turkey, fish, eggs, tofu, cheese, cottage cheese, and peanut butter. A serving size of meat is 3 ounces, which is about the size of a deck of cards. Examples of meat substitute serving sizes (equal to 1 ounce of meat) are 1/4 cup of cottage cheese, 1 egg, 1 tablespoon of peanut butter, and ½ cup of tofu. These have very little or no carbohydrate per serving. ¨ Vegetables: Starchy vegetables such as ½ cup of cooked dried beans, peas, potatoes, or corn have about 15 grams of carbohydrate. Nonstarchy vegetables have very little carbohydrate, such as 1 cup of raw leafy vegetables (such as spinach), ½ cup of other vegetables (cooked or chopped), and 3/4 cup of vegetable juice. · Use the plate format to plan meals. It is a good, quick way to make sure that you have a balanced meal. It also helps you spread carbohydrate throughout the day. You divide your plate by types of foods. Put vegetables on half the plate, meat or meat substitutes on one-quarter of the plate, and a grain or starchy vegetable (such as brown rice or a potato) in the final quarter of the plate. To this you can add a small piece of fruit and 1 cup of milk or yogurt, depending on how much carbohydrate you are supposed to eat at a meal.  · Talk to your dietitian or diabetes educator about ways to add limited amounts of sweets into your meal plan. You can eat these foods now and then, as long as you include the amount of carbohydrate they have in your daily carbohydrate allowance. · If you drink alcohol, limit it to no more than 1 drink a day for women and 2 drinks a day for men. If you are pregnant, no amount of alcohol is known to be safe. · Protein, fat, and fiber do not raise blood sugar as much as carbohydrate does. If you eat a lot of these nutrients in a meal, your blood sugar will rise more slowly than it would otherwise. · Limit saturated fats, such as those from meat and dairy products. Try to replace it with monounsaturated fat, such as olive oil.  This is a healthier choice because people who have diabetes are at higher-than-average risk of heart disease. But use a modest amount of olive oil. A tablespoon of olive oil has 14 grams of fat and 120 calories. · Exercise lowers blood sugar. If you take insulin by shots or pump, you can use less than you would if you were not exercising. Keep in mind that timing matters. If you exercise within 1 hour after a meal, your body may need less insulin for that meal than it would if you exercised 3 hours after the meal. Test your blood sugar to find out how exercise affects your need for insulin. · Exercise on most days of the week. Aim for at least 30 minutes. Exercise helps you stay at a healthy weight and helps your body use insulin. Walking is an easy way to get exercise. Gradually increase the amount you walk every day. You also may want to swim, bike, or do other activities. When you eat out  · Learn to estimate the serving sizes of foods that have carbohydrate. If you measure food at home, it will be easier to estimate the amount in a serving of restaurant food. · If the meal you order has too much carbohydrate (such as potatoes, corn, or baked beans), ask to have a low-carbohydrate food instead. Ask for a salad or green vegetables. · If you use insulin, check your blood sugar before and after eating out to help you plan how much to eat in the future. · If you eat more carbohydrate at a meal than you had planned, take a walk or do other exercise. This will help lower your blood sugar. Where can you learn more? Go to Ongage.be  Enter V955 in the search box to learn more about \"Nutrition Tips for Diabetes: After Your Visit. \"   © 9125-8495 [employer]. Care instructions adapted under license by [facility] (which disclaims liability or warranty for this information).  This care instruction is for use with your licensed healthcare professional. If you have questions about a medical condition or this instruction, always ask your healthcare professional. [place] [address] any warranty or liability for your use of this information. Content Version: 91.8.505613; Current as of: June 4, 2014      WEIGHT LOSS PLAN    1. Read food labels and count calories. Goal 5155-2910 calories daily for women and 0021-3682 calories daily for men. Achieve this by decreasing caloric intake by 500 calories by weekly increments. NOTE:  1 pound = 3500 calories! Www.loseit. EZprints.com  Www.Swiss[employer] Mobilenesspal.EZprints.com  Www.Kadoink  Www.Facishare. gov  Weight watchers mobile    Calories needed to lose 1-2 pounds a week = 10 x your weight in pounds    2. Increase water intake. Per [facility] loss Program, it is important to drink 1/2 your body weight in ounces of water daily. Decrease your water consumption 2-3 hours before bedtime to prevent sleep disturbance from frequent urination. 3.  Decrease sugary beverages. Each can or glass of soda increases your risk of obesity by 60%. Can lose 10 pounds in a month by avoiding any soda. 12 oz can of soda = 140 calories, 16 oz cup of sweet tea = 200 calories    16 oz orange juice = 200 calories, 10 oz apple juice = 150 calories   32 oz sports drink = 200 calories, 16 oz punch = 240 calories   3.5 oz alcohol = 100-150 calories     4. Avoid High Fructose Corn Syrup products. This ingredient makes products highly addictive! 5. Exercise 30 minutes daily 5 days weekly, minimally. If you burn 3500 calories that equals a pound! Use a pedometer to count steps. Visit www. Cubeyou. EZprints.com for a free pedometer and diet recommendations. Your maximum heart rate = 220 - your age    Never exercise at your maximum heart rate. See handout for target heart rate. 6.  Decrease carbohydrates (white bread, pasta, rice, potatoes, sweet foods and sweet drinks like soda, tea, coffee, juice and sports drinks). Increase fiber and protein.     Goal:   calories daily of carbohydrates     Try CHROMIUM PICONATE 200 MG THREE TIMES DAILY,    to decrease Premenstrual carbohydrate cravings. 7.  Eat 3-5 small meals daily, include lots of protein (beans/legumes, nuts, lean meat, eggs) and green vegetables with each. (Breakfast, lunch, dinner with 2 healthy snacks)    8. Get proper rest 7-8 hours uninterrupted. When you get less than 6 hours, it triggers hunger by affecting your Grehlin:Leptin ratio and this results in weight gain. 9.  Watch your food portions. Green leafy vegetables should cover 1/2 of the plate, lean meat 1/4 of the plate, starchy vegetable 1/4 of the plate. Use smaller plates. 10.  Do not eat until you are full. Eat until you are no longer hungry. If you are not sure, try drinking a glass of water before getting your second serving of food. 11.  Do not weigh yourself daily. Wait until your next office visit. Use how you feel and  how your clothes fit as measurements of success. 12.  Address your spirituality to draw strength from above during your journey. Remember \"I am fearfully and wonderfully made. Marvelous in His eyes. \"    13. Set realistic, appropriate and achievable weight loss goals:     RECOMMENDED TARGET WEIGHT LOSS:  Initial weight loss of 5-10% of your initial body weight achieved over 6 months or a decrease of 2 BMI units. MINIMUM GOAL OF WEIGHT LOSS:  Reduce body weight and maintain a lower body weight. Prevent weight gain. RELATED WEBSITES:      Www.obesityaction. org  (and consider joining the Obesity Action Coalition for $25/year. The [facility] mission is to elevated and empower those affected by obesity through education, advocacy and support. Quarterly journals included in membership fee. )    Www.Integrated Media Measurement (IMMI). Composeright  www.Smackages.com     RELATED DIETS:  Dr. Mario Blackwood Weight loss challenge, Mediterranean diet, [address] [place] [address] Watchers, Paleo Diet (anti-inflammatory diet)        .bonny          Nutrition Tips for Diabetes: After Your Visit  Your Care Instructions  A healthy diet is important to manage diabetes. It helps you lose weight (if you need to) and keep it off. It gives you the nutrition and energy your body needs and helps prevent heart disease. But a diet for diabetes does not mean that you have to eat special foods. You can eat what your family eats, including occasional sweets and other favorites. But you do have to pay attention to how often you eat and how much you eat of certain foods. The right plan for you will give you meals that help you keep your blood sugar at healthy levels. Try to eat a variety of foods and to spread carbohydrate throughout the day. Carbohydrate raises blood sugar higher and more quickly than any other nutrient does. Carbohydrate is found in sugar, breads and cereals, fruit, starchy vegetables such as potatoes and corn, and milk and yogurt. You may want to work with a dietitian or diabetes educator to help you plan meals and snacks. A dietitian or diabetes educator also can help you lose weight if that is one of your goals. The following tips can help you enjoy your meals and stay healthy. Follow-up care is a key part of your treatment and safety. Be sure to make and go to all appointments, and call your doctor if you are having problems. Its also a good idea to know your test results and keep a list of the medicines you take. How can you care for yourself at home? · Learn which foods have carbohydrate and how much carbohydrate to eat. A dietitian or diabetes educator can help you learn to keep track of how much carbohydrate you eat. · Spread carbohydrate throughout the day. Eat some carbohydrate at all meals, but do not eat too much at any one time. · Plan meals to include food from all the food groups.  These are the food groups and some example portion sizes:  ¨ Grains: 1 slice of bread (1 ounce), ½ cup of cooked cereal, and 1/3 cup of cooked pasta or rice. These have about 15 grams of carbohydrate in a serving. Choose whole grains such as whole wheat bread or crackers, oatmeal, and brown rice more often than refined grains. ¨ Fruit: 1 small fresh fruit, such as an apple or orange; ½ of a banana; ½ cup of chopped, cooked, or canned fruit; ½ cup of fruit juice; 1 cup of melon or raspberries; and 2 tablespoons of dried fruit. These have about 15 grams of carbohydrate in a serving. ¨ Dairy: 1 cup of nonfat or low-fat milk and 2/3 cup of plain yogurt. These have about 15 grams of carbohydrate in a serving. ¨ Protein foods: Beef, chicken, turkey, fish, eggs, tofu, cheese, cottage cheese, and peanut butter. A serving size of meat is 3 ounces, which is about the size of a deck of cards. Examples of meat substitute serving sizes (equal to 1 ounce of meat) are 1/4 cup of cottage cheese, 1 egg, 1 tablespoon of peanut butter, and ½ cup of tofu. These have very little or no carbohydrate per serving. ¨ Vegetables: Starchy vegetables such as ½ cup of cooked dried beans, peas, potatoes, or corn have about 15 grams of carbohydrate. Nonstarchy vegetables have very little carbohydrate, such as 1 cup of raw leafy vegetables (such as spinach), ½ cup of other vegetables (cooked or chopped), and 3/4 cup of vegetable juice. · Use the plate format to plan meals. It is a good, quick way to make sure that you have a balanced meal. It also helps you spread carbohydrate throughout the day. You divide your plate by types of foods. Put vegetables on half the plate, meat or meat substitutes on one-quarter of the plate, and a grain or starchy vegetable (such as brown rice or a potato) in the final quarter of the plate.  To this you can add a small piece of fruit and 1 cup of milk or yogurt, depending on how much carbohydrate you are supposed to eat at a meal.  · Talk to your dietitian or diabetes educator about ways to add limited amounts of sweets into your meal plan. You can eat these foods now and then, as long as you include the amount of carbohydrate they have in your daily carbohydrate allowance. · If you drink alcohol, limit it to no more than 1 drink a day for women and 2 drinks a day for men. If you are pregnant, no amount of alcohol is known to be safe. · Protein, fat, and fiber do not raise blood sugar as much as carbohydrate does. If you eat a lot of these nutrients in a meal, your blood sugar will rise more slowly than it would otherwise. · Limit saturated fats, such as those from meat and dairy products. Try to replace it with monounsaturated fat, such as olive oil. This is a healthier choice because people who have diabetes are at higher-than-average risk of heart disease. But use a modest amount of olive oil. A tablespoon of olive oil has 14 grams of fat and 120 calories. · Exercise lowers blood sugar. If you take insulin by shots or pump, you can use less than you would if you were not exercising. Keep in mind that timing matters. If you exercise within 1 hour after a meal, your body may need less insulin for that meal than it would if you exercised 3 hours after the meal. Test your blood sugar to find out how exercise affects your need for insulin. · Exercise on most days of the week. Aim for at least 30 minutes. Exercise helps you stay at a healthy weight and helps your body use insulin. Walking is an easy way to get exercise. Gradually increase the amount you walk every day. You also may want to swim, bike, or do other activities. When you eat out  · Learn to estimate the serving sizes of foods that have carbohydrate. If you measure food at home, it will be easier to estimate the amount in a serving of restaurant food. · If the meal you order has too much carbohydrate (such as potatoes, corn, or baked beans), ask to have a low-carbohydrate food instead. Ask for a salad or green vegetables.   · If you use insulin, check your blood sugar before and after eating out to help you plan how much to eat in the future. · If you eat more carbohydrate at a meal than you had planned, take a walk or do other exercise. This will help lower your blood sugar. Where can you learn more? Go to [email or web address]be  Enter K808 in the search box to learn more about \"Nutrition Tips for Diabetes: After Your Visit. \"   © 4755-8122 [employer]. Care instructions adapted under license by [facility] (which disclaims liability or warranty for this information). This care instruction is for use with your licensed healthcare professional. If you have questions about a medical condition or this instruction, always ask your healthcare professional. [address] any warranty or liability for your use of this information. Content Version: 09.1.609816; Current as of: June 4, 2014              Nutrition Tips for Diabetes: After Your Visit  Your Care Instructions  A healthy diet is important to manage diabetes. It helps you lose weight (if you need to) and keep it off. It gives you the nutrition and energy your body needs and helps prevent heart disease. But a diet for diabetes does not mean that you have to eat special foods. You can eat what your family eats, including occasional sweets and other favorites. But you do have to pay attention to how often you eat and how much you eat of certain foods. The right plan for you will give you meals that help you keep your blood sugar at healthy levels. Try to eat a variety of foods and to spread carbohydrate throughout the day. Carbohydrate raises blood sugar higher and more quickly than any other nutrient does. Carbohydrate is found in sugar, breads and cereals, fruit, starchy vegetables such as potatoes and corn, and milk and yogurt. You may want to work with a dietitian or diabetes educator to help you plan meals and snacks.  A dietitian or diabetes educator also can help you lose weight if that is one of your goals. The following tips can help you enjoy your meals and stay healthy. Follow-up care is a key part of your treatment and safety. Be sure to make and go to all appointments, and call your doctor if you are having problems. Its also a good idea to know your test results and keep a list of the medicines you take. How can you care for yourself at home? · Learn which foods have carbohydrate and how much carbohydrate to eat. A dietitian or diabetes educator can help you learn to keep track of how much carbohydrate you eat. · Spread carbohydrate throughout the day. Eat some carbohydrate at all meals, but do not eat too much at any one time. · Plan meals to include food from all the food groups. These are the food groups and some example portion sizes:  ¨ Grains: 1 slice of bread (1 ounce), ½ cup of cooked cereal, and 1/3 cup of cooked pasta or rice. These have about 15 grams of carbohydrate in a serving. Choose whole grains such as whole wheat bread or crackers, oatmeal, and brown rice more often than refined grains. ¨ Fruit: 1 small fresh fruit, such as an apple or orange; ½ of a banana; ½ cup of chopped, cooked, or canned fruit; ½ cup of fruit juice; 1 cup of melon or raspberries; and 2 tablespoons of dried fruit. These have about 15 grams of carbohydrate in a serving. ¨ Dairy: 1 cup of nonfat or low-fat milk and 2/3 cup of plain yogurt. These have about 15 grams of carbohydrate in a serving. ¨ Protein foods: Beef, chicken, turkey, fish, eggs, tofu, cheese, cottage cheese, and peanut butter. A serving size of meat is 3 ounces, which is about the size of a deck of cards. Examples of meat substitute serving sizes (equal to 1 ounce of meat) are 1/4 cup of cottage cheese, 1 egg, 1 tablespoon of peanut butter, and ½ cup of tofu. These have very little or no carbohydrate per serving.   ¨ Vegetables: Starchy vegetables such as ½ cup of cooked dried beans, peas, potatoes, or corn have about 15 grams of carbohydrate. Nonstarchy vegetables have very little carbohydrate, such as 1 cup of raw leafy vegetables (such as spinach), ½ cup of other vegetables (cooked or chopped), and 3/4 cup of vegetable juice. · Use the plate format to plan meals. It is a good, quick way to make sure that you have a balanced meal. It also helps you spread carbohydrate throughout the day. You divide your plate by types of foods. Put vegetables on half the plate, meat or meat substitutes on one-quarter of the plate, and a grain or starchy vegetable (such as brown rice or a potato) in the final quarter of the plate. To this you can add a small piece of fruit and 1 cup of milk or yogurt, depending on how much carbohydrate you are supposed to eat at a meal.  · Talk to your dietitian or diabetes educator about ways to add limited amounts of sweets into your meal plan. You can eat these foods now and then, as long as you include the amount of carbohydrate they have in your daily carbohydrate allowance. · If you drink alcohol, limit it to no more than 1 drink a day for women and 2 drinks a day for men. If you are pregnant, no amount of alcohol is known to be safe. · Protein, fat, and fiber do not raise blood sugar as much as carbohydrate does. If you eat a lot of these nutrients in a meal, your blood sugar will rise more slowly than it would otherwise. · Limit saturated fats, such as those from meat and dairy products. Try to replace it with monounsaturated fat, such as olive oil. This is a healthier choice because people who have diabetes are at higher-than-average risk of heart disease. But use a modest amount of olive oil. A tablespoon of olive oil has 14 grams of fat and 120 calories. · Exercise lowers blood sugar. If you take insulin by shots or pump, you can use less than you would if you were not exercising. Keep in mind that timing matters.  If you exercise within 1 hour after a meal, your body may need less insulin for that meal than it would if you exercised 3 hours after the meal. Test your blood sugar to find out how exercise affects your need for insulin. · Exercise on most days of the week. Aim for at least 30 minutes. Exercise helps you stay at a healthy weight and helps your body use insulin. Walking is an easy way to get exercise. Gradually increase the amount you walk every day. You also may want to swim, bike, or do other activities. When you eat out  · Learn to estimate the serving sizes of foods that have carbohydrate. If you measure food at home, it will be easier to estimate the amount in a serving of restaurant food. · If the meal you order has too much carbohydrate (such as potatoes, corn, or baked beans), ask to have a low-carbohydrate food instead. Ask for a salad or green vegetables. · If you use insulin, check your blood sugar before and after eating out to help you plan how much to eat in the future. · If you eat more carbohydrate at a meal than you had planned, take a walk or do other exercise. This will help lower your blood sugar. Where can you learn more? Go to [employer]LuLu.be  Enter Z420 in the search box to learn more about \"Nutrition Tips for Diabetes: After Your Visit. \"   © 0770-2459 [employer]. Care instructions adapted under license by [facility] (which disclaims liability or warranty for this information). This care instruction is for use with your licensed healthcare professional. If you have questions about a medical condition or this instruction, always ask your healthcare professional. [place] [address] any warranty or liability for your use of this information.   Content Version: 35.3.571752; Current as of: June 4, 2014

## 2018-06-29 NOTE — PROGRESS NOTES
HISTORY OF PRESENT ILLNESS  Elder Kun is a 47 y.o. female presents with Weight Management; Diabetes; Labs; and Epistaxis (has had a few herson nose bleeds)    Agree with nurse note. Pt with type 2 DM, mixed dyslipidemia, vit d deficiency, and family hx of DM and heart disease presents to the office with a BP of 132/84. Weight is 175 lbs, up from 168 lbs in 12/2017 and 162 lbs in 06/2017. She attributes weight gain to the winter months and eating things she knows she shouldn't have. She also stopped exercising. She was also on vacation last week and did not eat well but walked every morning. Pt takes Invokamet 50 mg for diabetes, tolerating well. She uses Crestor 20 mg for cholesterol when she remembers, tolerating well. For kidney protection, she takes Enalapril 2.5 mg daily, tolerating well. Health Maintenance    Pt with family hx of colon cancer. She reports she will schedule a colonoscopy. Pt with family hx of glaucoma. Pt's most recent eye exam on 4/18/2018 with Dr. Chilango Zee. Pt with bilateral ocular hypertension and presbyopia OU, stable. Newly dx'd L mild nonproliferative rentinopathy. F/U 1 year. Pt reports her most recent pap smear was in 12/2017 with Dr. Cesar Neumann. Pt with personal hx and family hx of breast cancer. Pt's most recent Mammogram on 12/21/2017 found scattered areas of fibroglandular density. Lumpectomy changes in L breast. No new masses or malignancy. F/U 1 year. Written by ralph Sommer, as dictated by Dr. Nyle Lefort, DO.    ROS    Review of Systems negative except as noted above in HPI.     ALLERGIES:  No Known Allergies    CURRENT MEDICATIONS:    Outpatient Prescriptions Marked as Taking for the 6/29/18 encounter (Office Visit) with Amanda Frausto DO   Medication Sig Dispense Refill    INVOKAMET  mg tab TAKE 1 TABLET BY MOUTH DAILY 30 Tab 2    enalapril (VASOTEC) 2.5 mg tablet TAKE 1 TABLET DAILY 90 Tab 3    rosuvastatin (CRESTOR) 20 mg tablet Take 1 Tab by mouth nightly. Indications: hyperlipidemia 90 Tab 3    cholecalciferol (VITAMIN D3) 1,000 unit cap Take  by mouth daily.  ibuprofen (MOTRIN) 600 mg tablet Take 1 Tab by mouth every six (6) hours as needed for Pain. 20 Tab 0       PAST MEDICAL HISTORY:    Past Medical History:   Diagnosis Date    Breast cancer ([facility]     Left. Txd with chemo,radiation. Dr. Jaya Erazo of elbow     Left.  Chickenpox childhood    Diabetes mellitus type 2, controlled, without complications ([facility]     Dr. Karen Palencia. Dr. Jamal Granados, ophth.  Dry eyes     Blair Garcias, OD. Dr. Justina Ramos Elevated cholesterol     Environmental allergies     Gestational diabetes     Glaucoma suspect of both eyes     Gomez MARTÍNEZ. Dr. Martina Hanna.  Heartburn 08    Mononucleosis 2006    Vitamin D deficiency        PAST SURGICAL HISTORY:    Past Surgical History:   Procedure Laterality Date    HX BREAST BIOPSY Left 1998    due to cancer. Dr. Erlin Nieves HX BREAST LUMPECTOMY Left 1999    with 18 lymph nodes removed.  Dr. Joshua Dubin   ,     Dr. Gisselle Kwok:    Family History   Problem Relation Age of Onset    Hypertension Mother     Diabetes Mother     High Cholesterol Mother     Heart Disease Father     Heart Surgery Father      CABG    Diabetes Father     Glaucoma Father     Colon Cancer Father 68    Breast Cancer Maternal Aunt      x2    Cancer Paternal Uncle      ?laryngeal    Diabetes Paternal Grandmother     Glaucoma Paternal Aunt        SOCIAL HISTORY:    Social History     Social History    Marital status:      Spouse name: N/A    Number of children: N/A    Years of education: N/A     Social History Main Topics    Smoking status: Never Smoker    Smokeless tobacco: Never Used    Alcohol use 0.0 oz/week     0 Cans of beer per week Comment: RARE    Drug use: No    Sexual activity: Yes     Partners: Male     Birth control/ protection: Surgical      Comment:  had a vasectomy     Other Topics Concern    None     Social History Narrative       IMMUNIZATIONS:    Immunization History   Administered Date(s) Administered    Influenza Vaccine 11/09/2015, 10/17/2017    Influenza Vaccine Split 11/03/2011    Influenza Vaccine Whole 10/01/2009         PHYSICAL EXAMINATION    Vital Signs    Visit Vitals    /84 (BP 1 Location: Right arm, BP Patient Position: Sitting)    Pulse 64    Temp 97.5 °F (36.4 °C) (Oral)    Resp 15    Ht 5' 2.01\" (1.575 m)    Wt 175 lb (79.4 kg)    SpO2 98%    BMI 32 kg/m2       Weight Metrics 6/29/2018 12/29/2017 6/29/2017 3/23/2017 12/28/2016 8/1/2016 4/22/2016   Weight 175 lb 168 lb 3.2 oz 162 lb 3.2 oz 158 lb 165 lb 8 oz 177 lb 3.2 oz 196 lb 9.6 oz   BMI 32 kg/m2 30.75 kg/m2 29.66 kg/m2 28.9 kg/m2 29.79 kg/m2 31.87 kg/m2 35.36 kg/m2       General appearance - Well nourished. Well appearing. Well developed. No acute distress. Obese. Head - Normocephalic. Atraumatic. Eyes - pupils equal and reactive. Extraocular eye movements intact. Sclera anicteric. Mildly injected sclera. Ears - Hearing is grossly normal bilaterally. Nose - normal and patent. No polyps noted. No erythema. No discharge. Mouth - mucous membranes with adequate moisture. Posterior pharynx normal with cobblestone appearance. No erythema, white exudate or obstruction. Neck - supple. Midline trachea. No carotid bruits noted bilaterally. No thyromegaly noted. Chest - clear to auscultation bilaterally anteriorly and posteriorly. No wheezes. No rales or rhonchi. Breath sounds are symmetrical bilaterally. Unlabored respirations. Heart - normal rate. Regular rhythm. Normal S1, S2. No murmur noted. No rubs, clicks or gallops noted. Abdomen - soft and distended. No masses or organomegaly.   No rebound, rigidity or guarding. Bowel sounds normal x 4 quadrants. No tenderness noted. Neurological - awake, alert and oriented to person, place, and time and event. Cranial nerves II through XII intact. Clear speech. Muscle strength is +5/5 x 4 extremities. Sensation is intact to light touch bilaterally. Steady gait. Heme/Lymph - peripheral pulses normal x 4 extremities. No peripheral edema is noted. Musculoskeletal - Intact x 4 extremities. Full ROM x 4 extremities. No pain with movement. Psychological -   normal behavior, dress and thought processes. Good insight. Good eye contact. Normal affect. Appropriate mood. Normal speech. DATA REVIEWED    Lab Results   Component Value Date/Time    WBC 6.3 10/30/2015 09:06 AM    HGB 13.9 10/30/2015 09:06 AM    HCT 40.9 10/30/2015 09:06 AM    PLATELET 894 22/32/3184 09:06 AM    MCV 88 10/30/2015 09:06 AM     Lab Results   Component Value Date/Time    Sodium 140 10/20/2017 08:20 AM    Potassium 4.5 10/20/2017 08:20 AM    Chloride 98 10/20/2017 08:20 AM    CO2 24 10/20/2017 08:20 AM    Anion gap 8 01/22/2010 08:30 AM    Glucose 125 (H) 10/20/2017 08:20 AM    BUN 23 10/20/2017 08:20 AM    Creatinine 0.54 (L) 10/20/2017 08:20 AM    BUN/Creatinine ratio 43 (H) 10/20/2017 08:20 AM    GFR est  10/20/2017 08:20 AM    GFR est non- 10/20/2017 08:20 AM    Calcium 9.8 10/20/2017 08:20 AM    Bilirubin, total 0.3 10/20/2017 08:20 AM    AST (SGOT) 17 10/20/2017 08:20 AM    Alk.  phosphatase 79 10/20/2017 08:20 AM    Protein, total 7.5 10/20/2017 08:20 AM    Albumin 4.7 10/20/2017 08:20 AM    Globulin 3.3 01/22/2010 08:30 AM    A-G Ratio 1.7 10/20/2017 08:20 AM    ALT (SGPT) 18 10/20/2017 08:20 AM     Lab Results   Component Value Date/Time    Cholesterol, total 290 (H) 10/20/2017 08:20 AM    HDL Cholesterol 44 10/20/2017 08:20 AM    LDL, calculated 203 (H) 10/20/2017 08:20 AM    VLDL, calculated 43 (H) 10/20/2017 08:20 AM    Triglyceride 214 (H) 10/20/2017 08:20 AM CHOL/HDL Ratio 6.5 (H) 01/22/2010 08:30 AM     Lab Results   Component Value Date/Time    Vitamin D 25-Hydroxy 25.2 (L) 09/27/2011 08:00 AM    VITAMIN D, 25-HYDROXY 33.2 06/23/2017 08:56 AM       Lab Results   Component Value Date/Time    Hemoglobin A1c 6.0 (H) 06/23/2017 08:56 AM    Hemoglobin A1c (POC) 6.0 12/29/2017 10:15 AM     Lab Results   Component Value Date/Time    TSH 2.600 06/23/2017 08:56 AM       Lab Results   Component Value Date/Time    Microalb/Creat ratio (ug/mg creat.) 7.0 12/21/2016 08:07 AM         ASSESSMENT and PLAN      ICD-10-CM ICD-9-CM    1. Controlled type 2 diabetes mellitus with left eye affected by mild nonproliferative retinopathy without macular edema, without long-term current use of insulin ([facility]) E11.3292 250.50      362.04    2. Mixed dyslipidemia E78.2 272.2    3. Colon cancer screening Z12.11 V76.51    4. Weight gain R63.5 783.1     13# since 06/2017 due to diet changes, reduced exercise, vacation last week [facility]   5. Personal history of breast cancer Z85.3 V10.3    6. Family history of glaucoma in father Z80.65 V19.11    7. Family history of colon cancer Z80.0 V16.0    8. Family history of diabetes mellitus (DM) Z83.3 V18.0    9. Family history of breast cancer Z80.3 V16.3    10. Family history of heart disease Z82.49 V17.49    11. Vitamin D deficiency E55.9 268.9        Discussed the patient's BMI with her. The BMI follow up plan is as follows: I have counseled this patient on diet and exercise regimens. Decrease carbohydrates (white foods, sweet foods, sweet drinks and alcohol), increase green leafy vegetables and protein (lean meats and beans) with each meal.  Avoid fried foods. Eat 3-5 small meals daily. Do not skip meals. Increase water intake. Increase physical activity to 30 minutes daily for health benefit or 60 minutes daily to prevent weight regain, as tolerated. Get 7-8 hours uninterrupted sleep nightly. Chart reviewed and updated.       Continue current medications and care. Recheck pertinent labs today. Recent office visit notes from Dr. Katrin Nieves reviewed. Get recent office visit notes from Dr. Ingris Angeles. Advised pt to sign release. Counseled patient on health concerns:  Diabetes, weight management, and cholesterol. Relevant handouts given and discussed with patient. Immunizations noted. Offered empathy, support, legitimation, prayers, partnership to patient. Praised patient for progress. Reminded pt to bring in copy of ACP. Follow-up Disposition:  Return in about 6 months (around 12/29/2018) for diabetes, weight, referral follow up. Patient was offered a choice/choices in the treatment plan today. Patient expresses understanding of the plan and agrees with recommendations. Written by ralph Castro, as dictated by Dr. Chad Paige DO. Documentation True and Accepted by Sergio Verduzco. Sandie Butterfield. Patient Instructions          Starting a Weight Loss Plan: Care Instructions  Your Care Instructions    If you are thinking about losing weight, it can be hard to know where to start. Your doctor can help you set up a weight loss plan that best meets your needs. You may want to take a class on nutrition or exercise, or join a weight loss support group. If you have questions about how to make changes to your eating or exercise habits, ask your doctor about seeing a registered dietitian or an exercise specialist.  It can be a big challenge to lose weight. But you do not have to make huge changes at once. Make small changes, and stick with them. When those changes become habit, add a few more changes. If you do not think you are ready to make changes right now, try to pick a date in the future. Make an appointment to see your doctor to discuss whether the time is right for you to start a plan. Follow-up care is a key part of your treatment and safety.  Be sure to make and go to all appointments, and call your doctor if you are having problems. It's also a good idea to know your test results and keep a list of the medicines you take. How can you care for yourself at home? · Set realistic goals. Many people expect to lose much more weight than is likely. A weight loss of 5% to 10% of your body weight may be enough to improve your health. · Get family and friends involved to provide support. Talk to them about why you are trying to lose weight, and ask them to help. They can help by participating in exercise and having meals with you, even if they may be eating something different. · Find what works best for you. If you do not have time or do not like to cook, a program that offers meal replacement bars or shakes may be better for you. Or if you like to prepare meals, finding a plan that includes daily menus and recipes may be best.  · Ask your doctor about other health professionals who can help you achieve your weight loss goals. ¨ A dietitian can help you make healthy changes in your diet. ¨ An exercise specialist or  can help you develop a safe and effective exercise program.  ¨ A counselor or psychiatrist can help you cope with issues such as depression, anxiety, or family problems that can make it hard to focus on weight loss. · Consider joining a support group for people who are trying to lose weight. Your doctor can suggest groups in your area. Where can you learn more? Go to http://thania-chong.info/. Enter A272 in the search box to learn more about \"Starting a Weight Loss Plan: Care Instructions. \"  Current as of: October 13, 2016  Content Version: 11.4  © 1722-6918 [employer]. Care instructions adapted under license by [employer] (which disclaims liability or warranty for this information).  If you have questions about a medical condition or this instruction, always ask your healthcare professional. Lilibeth Worley disclaims any warranty or liability for your use of this information. Starting a Weight Loss Plan: Care Instructions  Your Care Instructions    If you are thinking about losing weight, it can be hard to know where to start. Your doctor can help you set up a weight loss plan that best meets your needs. You may want to take a class on nutrition or exercise, or join a weight loss support group. If you have questions about how to make changes to your eating or exercise habits, ask your doctor about seeing a registered dietitian or an exercise specialist.  It can be a big challenge to lose weight. But you do not have to make huge changes at once. Make small changes, and stick with them. When those changes become habit, add a few more changes. If you do not think you are ready to make changes right now, try to pick a date in the future. Make an appointment to see your doctor to discuss whether the time is right for you to start a plan. Follow-up care is a key part of your treatment and safety. Be sure to make and go to all appointments, and call your doctor if you are having problems. It's also a good idea to know your test results and keep a list of the medicines you take. How can you care for yourself at home? · Set realistic goals. Many people expect to lose much more weight than is likely. A weight loss of 5% to 10% of your body weight may be enough to improve your health. · Get family and friends involved to provide support. Talk to them about why you are trying to lose weight, and ask them to help. They can help by participating in exercise and having meals with you, even if they may be eating something different. · Find what works best for you. If you do not have time or do not like to cook, a program that offers meal replacement bars or shakes may be better for you.  Or if you like to prepare meals, finding a plan that includes daily menus and recipes may be best.  · Ask your doctor about other health professionals who can help you achieve your weight loss goals. ¨ A dietitian can help you make healthy changes in your diet. ¨ An exercise specialist or  can help you develop a safe and effective exercise program.  ¨ A counselor or psychiatrist can help you cope with issues such as depression, anxiety, or family problems that can make it hard to focus on weight loss. · Consider joining a support group for people who are trying to lose weight. Your doctor can suggest groups in your area. Where can you learn more? Go to http://thania-chong.info/. Enter Q503 in the search box to learn more about \"Starting a Weight Loss Plan: Care Instructions. \"  Current as of: October 13, 2016  Content Version: 11.4  © 1188-7423 [employer]. Care instructions adapted under license by [employer] (which disclaims liability or warranty for this information). If you have questions about a medical condition or this instruction, always ask your healthcare professional. [place] [address] any warranty or liability for your use of this information. Learning About Low-Carbohydrate Diets for Weight Loss  What is a low-carbohydrate diet? Low-carb diets avoid foods that are high in carbohydrate. These high-carb foods include pasta, bread, rice, cereal, fruits, and starchy vegetables. Instead, these diets usually have you eat foods that are high in fat and protein. Many people lose weight quickly on a low-carb diet. But the early weight loss is water. People on this diet often gain the weight back after they start eating carbs again. Not all diet plans are safe or work well. A lot of the evidence shows that low-carb diets aren't healthy. That's because these diets often don't include healthy foods like fruits and vegetables. Losing weight safely means balancing protein, fat, and carbs with every meal and snack.  And low-carb diets don't always provide the vitamins, minerals, and fiber you need. If you have a serious medical condition, talk to your doctor before you try any diet. These conditions include kidney disease, heart disease, type 2 diabetes, high cholesterol, and high blood pressure. If you are pregnant, it may not be safe for your baby if you are on a low-carb diet. How can you lose weight safely? You might have heard that a diet plan helped another person lose weight. But that doesn't mean that it will work for you. It is very hard to stay on a diet that includes lots of big changes in your eating habits. If you want to get to a healthy weight and stay there, making healthy lifestyle changes will often work better than dieting. These steps can help. · Make a plan for change. Work with your doctor to create a plan that is right for you. · See a dietitian. He or she can show you how to make healthy changes in your eating habits. · Manage stress. If you have a lot of stress in your life, it can be hard to focus on making healthy changes to your daily habits. · Track your food and activity. You are likely to do better at losing weight if you keep track of what you eat and what you do. Follow-up care is a key part of your treatment and safety. Be sure to make and go to all appointments, and call your doctor if you are having problems. It's also a good idea to know your test results and keep a list of the medicines you take. Where can you learn more? Go to http://thania-chong.info/. Enter A121 in the search box to learn more about \"Learning About Low-Carbohydrate Diets for Weight Loss. \"  Current as of: May 12, 2017  Content Version: 11.4  © 0644-8706 [employer]. Care instructions adapted under license by [employer] (which disclaims liability or warranty for this information).  If you have questions about a medical condition or this instruction, always ask your healthcare professional. Bela Garcia disclaims any warranty or liability for your use of this information. Recommend AHA new dietary guidelines to improve weight, cardiovascular and general health. Limit daily added sugar consumption to 6 tsp/25 grams/100 blanka/daily for women and 9 tsp/37 grams/150 blanka/daily for men. 1 tsp= 4 g    Nutrition Tips for Diabetes: After Your Visit  Your Care Instructions  A healthy diet is important to manage diabetes. It helps you lose weight (if you need to) and keep it off. It gives you the nutrition and energy your body needs and helps prevent heart disease. But a diet for diabetes does not mean that you have to eat special foods. You can eat what your family eats, including occasional sweets and other favorites. But you do have to pay attention to how often you eat and how much you eat of certain foods. The right plan for you will give you meals that help you keep your blood sugar at healthy levels. Try to eat a variety of foods and to spread carbohydrate throughout the day. Carbohydrate raises blood sugar higher and more quickly than any other nutrient does. Carbohydrate is found in sugar, breads and cereals, fruit, starchy vegetables such as potatoes and corn, and milk and yogurt. You may want to work with a dietitian or diabetes educator to help you plan meals and snacks. A dietitian or diabetes educator also can help you lose weight if that is one of your goals. The following tips can help you enjoy your meals and stay healthy. Follow-up care is a key part of your treatment and safety. Be sure to make and go to all appointments, and call your doctor if you are having problems. Its also a good idea to know your test results and keep a list of the medicines you take. How can you care for yourself at home? · Learn which foods have carbohydrate and how much carbohydrate to eat. A dietitian or diabetes educator can help you learn to keep track of how much carbohydrate you eat. · Spread carbohydrate throughout the day.  Eat some carbohydrate at all meals, but do not eat too much at any one time. · Plan meals to include food from all the food groups. These are the food groups and some example portion sizes:  ¨ Grains: 1 slice of bread (1 ounce), ½ cup of cooked cereal, and 1/3 cup of cooked pasta or rice. These have about 15 grams of carbohydrate in a serving. Choose whole grains such as whole wheat bread or crackers, oatmeal, and brown rice more often than refined grains. ¨ Fruit: 1 small fresh fruit, such as an apple or orange; ½ of a banana; ½ cup of chopped, cooked, or canned fruit; ½ cup of fruit juice; 1 cup of melon or raspberries; and 2 tablespoons of dried fruit. These have about 15 grams of carbohydrate in a serving. ¨ Dairy: 1 cup of nonfat or low-fat milk and 2/3 cup of plain yogurt. These have about 15 grams of carbohydrate in a serving. ¨ Protein foods: Beef, chicken, turkey, fish, eggs, tofu, cheese, cottage cheese, and peanut butter. A serving size of meat is 3 ounces, which is about the size of a deck of cards. Examples of meat substitute serving sizes (equal to 1 ounce of meat) are 1/4 cup of cottage cheese, 1 egg, 1 tablespoon of peanut butter, and ½ cup of tofu. These have very little or no carbohydrate per serving. ¨ Vegetables: Starchy vegetables such as ½ cup of cooked dried beans, peas, potatoes, or corn have about 15 grams of carbohydrate. Nonstarchy vegetables have very little carbohydrate, such as 1 cup of raw leafy vegetables (such as spinach), ½ cup of other vegetables (cooked or chopped), and 3/4 cup of vegetable juice. · Use the plate format to plan meals. It is a good, quick way to make sure that you have a balanced meal. It also helps you spread carbohydrate throughout the day. You divide your plate by types of foods. Put vegetables on half the plate, meat or meat substitutes on one-quarter of the plate, and a grain or starchy vegetable (such as brown rice or a potato) in the final quarter of the plate. To this you can add a small piece of fruit and 1 cup of milk or yogurt, depending on how much carbohydrate you are supposed to eat at a meal.  · Talk to your dietitian or diabetes educator about ways to add limited amounts of sweets into your meal plan. You can eat these foods now and then, as long as you include the amount of carbohydrate they have in your daily carbohydrate allowance. · If you drink alcohol, limit it to no more than 1 drink a day for women and 2 drinks a day for men. If you are pregnant, no amount of alcohol is known to be safe. · Protein, fat, and fiber do not raise blood sugar as much as carbohydrate does. If you eat a lot of these nutrients in a meal, your blood sugar will rise more slowly than it would otherwise. · Limit saturated fats, such as those from meat and dairy products. Try to replace it with monounsaturated fat, such as olive oil. This is a healthier choice because people who have diabetes are at higher-than-average risk of heart disease. But use a modest amount of olive oil. A tablespoon of olive oil has 14 grams of fat and 120 calories. · Exercise lowers blood sugar. If you take insulin by shots or pump, you can use less than you would if you were not exercising. Keep in mind that timing matters. If you exercise within 1 hour after a meal, your body may need less insulin for that meal than it would if you exercised 3 hours after the meal. Test your blood sugar to find out how exercise affects your need for insulin. · Exercise on most days of the week. Aim for at least 30 minutes. Exercise helps you stay at a healthy weight and helps your body use insulin. Walking is an easy way to get exercise. Gradually increase the amount you walk every day. You also may want to swim, bike, or do other activities. When you eat out  · Learn to estimate the serving sizes of foods that have carbohydrate.  If you measure food at home, it will be easier to estimate the amount in a serving of restaurant food. · If the meal you order has too much carbohydrate (such as potatoes, corn, or baked beans), ask to have a low-carbohydrate food instead. Ask for a salad or green vegetables. · If you use insulin, check your blood sugar before and after eating out to help you plan how much to eat in the future. · If you eat more carbohydrate at a meal than you had planned, take a walk or do other exercise. This will help lower your blood sugar. Where can you learn more? Go to Codeanywhere.be  Enter A966 in the search box to learn more about \"Nutrition Tips for Diabetes: After Your Visit. \"   © 1780-0680 Healthwise, Incorporated. Care instructions adapted under license by [facility] [employer] (which disclaims liability or warranty for this information). This care instruction is for use with your licensed healthcare professional. If you have questions about a medical condition or this instruction, always ask your healthcare professional. [place] [address] any warranty or liability for your use of this information. Content Version: 36.9.017232; Current as of: June 4, 2014      WEIGHT LOSS PLAN    1. Read food labels and count calories. Goal 2800-1136 calories daily for women and 6916-3226 calories daily for men. Achieve this by decreasing caloric intake by 500 calories by weekly increments. NOTE:  1 pound = 3500 calories! Www.loseit. com  Www.myfitnesspal.com  Www.[employer]. [employer]  Www.healthfinder. gov  Weight watchers mobile    Calories needed to lose 1-2 pounds a week = 10 x your weight in pounds    2. Increase water intake. Per [employer] [facility], it is important to drink 1/2 your body weight in ounces of water daily. Decrease your water consumption 2-3 hours before bedtime to prevent sleep disturbance from frequent urination. 3.  Decrease sugary beverages. Each can or glass of soda increases your risk of obesity by 60%.   Can lose 10 pounds in a month by avoiding any soda. 12 oz can of soda = 140 calories, 16 oz cup of sweet tea = 200 calories    16 oz orange juice = 200 calories, 10 oz apple juice = 150 calories   32 oz sports drink = 200 calories, 16 oz punch = 240 calories   3.5 oz alcohol = 100-150 calories     4. Avoid High Fructose Corn Syrup products. This ingredient makes products highly addictive! 5. Exercise 30 minutes daily 5 days weekly, minimally. If you burn 3500 calories that equals a pound! Use a pedometer to count steps. Visit www. [employer] for a free pedometer and diet recommendations. Your maximum heart rate = 220 - your age    Never exercise at your maximum heart rate. See handout for target heart rate. 6.  Decrease carbohydrates (white bread, pasta, rice, potatoes, sweet foods and sweet drinks like soda, tea, coffee, juice and sports drinks). Increase fiber and protein. Goal:   calories daily of carbohydrates     Try CHROMIUM PICONATE 200 MG THREE TIMES DAILY,    to decrease Premenstrual carbohydrate cravings. 7.  Eat 3-5 small meals daily, include lots of protein (beans/legumes, nuts, lean meat, eggs) and green vegetables with each. (Breakfast, lunch, dinner with 2 healthy snacks)    8. Get proper rest 7-8 hours uninterrupted. When you get less than 6 hours, it triggers hunger by affecting your Grehlin:Leptin ratio and this results in weight gain. 9.  Watch your food portions. Green leafy vegetables should cover 1/2 of the plate, lean meat 1/4 of the plate, starchy vegetable 1/4 of the plate. Use smaller plates. 10.  Do not eat until you are full. Eat until you are no longer hungry. If you are not sure, try drinking a glass of water before getting your second serving of food. 11.  Do not weigh yourself daily. Wait until your next office visit. Use how you feel and  how your clothes fit as measurements of success.     12.  Address your spirituality to draw strength from above during your journey. Remember \"I am fearfully and wonderfully made. Marvelous in His eyes. \"    13. Set realistic, appropriate and achievable weight loss goals:     RECOMMENDED TARGET WEIGHT LOSS:  Initial weight loss of 5-10% of your initial body weight achieved over 6 months or a decrease of 2 BMI units. MINIMUM GOAL OF WEIGHT LOSS:  Reduce body weight and maintain a lower body weight. Prevent weight gain. RELATED WEBSITES:      Www.obesityaction. org  (and consider joining the Obesity Action Coalition for $25/year. The [facility] mission is to elevated and empower those affected by obesity through education, advocacy and support. Quarterly journals included in membership fee. )    Www.[employer]  www.Hedvig.com     RELATED DIETS:  Dr. Chanel Freeman Weight loss challenge, Mediterranean diet, [address] [place] [address], [facility] Watchers, Paleo Diet (anti-inflammatory diet)        . bonny          Nutrition Tips for Diabetes: After Your Visit  Your Care Instructions  A healthy diet is important to manage diabetes. It helps you lose weight (if you need to) and keep it off. It gives you the nutrition and energy your body needs and helps prevent heart disease. But a diet for diabetes does not mean that you have to eat special foods. You can eat what your family eats, including occasional sweets and other favorites. But you do have to pay attention to how often you eat and how much you eat of certain foods. The right plan for you will give you meals that help you keep your blood sugar at healthy levels. Try to eat a variety of foods and to spread carbohydrate throughout the day. Carbohydrate raises blood sugar higher and more quickly than any other nutrient does. Carbohydrate is found in sugar, breads and cereals, fruit, starchy vegetables such as potatoes and corn, and milk and yogurt. You may want to work with a dietitian or diabetes educator to help you plan meals and snacks.  A dietitian or diabetes educator also can help you lose weight if that is one of your goals. The following tips can help you enjoy your meals and stay healthy. Follow-up care is a key part of your treatment and safety. Be sure to make and go to all appointments, and call your doctor if you are having problems. Its also a good idea to know your test results and keep a list of the medicines you take. How can you care for yourself at home? · Learn which foods have carbohydrate and how much carbohydrate to eat. A dietitian or diabetes educator can help you learn to keep track of how much carbohydrate you eat. · Spread carbohydrate throughout the day. Eat some carbohydrate at all meals, but do not eat too much at any one time. · Plan meals to include food from all the food groups. These are the food groups and some example portion sizes:  ¨ Grains: 1 slice of bread (1 ounce), ½ cup of cooked cereal, and 1/3 cup of cooked pasta or rice. These have about 15 grams of carbohydrate in a serving. Choose whole grains such as whole wheat bread or crackers, oatmeal, and brown rice more often than refined grains. ¨ Fruit: 1 small fresh fruit, such as an apple or orange; ½ of a banana; ½ cup of chopped, cooked, or canned fruit; ½ cup of fruit juice; 1 cup of melon or raspberries; and 2 tablespoons of dried fruit. These have about 15 grams of carbohydrate in a serving. ¨ Dairy: 1 cup of nonfat or low-fat milk and 2/3 cup of plain yogurt. These have about 15 grams of carbohydrate in a serving. ¨ Protein foods: Beef, chicken, turkey, fish, eggs, tofu, cheese, cottage cheese, and peanut butter. A serving size of meat is 3 ounces, which is about the size of a deck of cards. Examples of meat substitute serving sizes (equal to 1 ounce of meat) are 1/4 cup of cottage cheese, 1 egg, 1 tablespoon of peanut butter, and ½ cup of tofu. These have very little or no carbohydrate per serving.   ¨ Vegetables: Starchy vegetables such as ½ cup of cooked dried beans, peas, potatoes, or corn have about 15 grams of carbohydrate. Nonstarchy vegetables have very little carbohydrate, such as 1 cup of raw leafy vegetables (such as spinach), ½ cup of other vegetables (cooked or chopped), and 3/4 cup of vegetable juice. · Use the plate format to plan meals. It is a good, quick way to make sure that you have a balanced meal. It also helps you spread carbohydrate throughout the day. You divide your plate by types of foods. Put vegetables on half the plate, meat or meat substitutes on one-quarter of the plate, and a grain or starchy vegetable (such as brown rice or a potato) in the final quarter of the plate. To this you can add a small piece of fruit and 1 cup of milk or yogurt, depending on how much carbohydrate you are supposed to eat at a meal.  · Talk to your dietitian or diabetes educator about ways to add limited amounts of sweets into your meal plan. You can eat these foods now and then, as long as you include the amount of carbohydrate they have in your daily carbohydrate allowance. · If you drink alcohol, limit it to no more than 1 drink a day for women and 2 drinks a day for men. If you are pregnant, no amount of alcohol is known to be safe. · Protein, fat, and fiber do not raise blood sugar as much as carbohydrate does. If you eat a lot of these nutrients in a meal, your blood sugar will rise more slowly than it would otherwise. · Limit saturated fats, such as those from meat and dairy products. Try to replace it with monounsaturated fat, such as olive oil. This is a healthier choice because people who have diabetes are at higher-than-average risk of heart disease. But use a modest amount of olive oil. A tablespoon of olive oil has 14 grams of fat and 120 calories. · Exercise lowers blood sugar. If you take insulin by shots or pump, you can use less than you would if you were not exercising. Keep in mind that timing matters.  If you exercise within 1 hour after a meal, your body may need less insulin for that meal than it would if you exercised 3 hours after the meal. Test your blood sugar to find out how exercise affects your need for insulin. · Exercise on most days of the week. Aim for at least 30 minutes. Exercise helps you stay at a healthy weight and helps your body use insulin. Walking is an easy way to get exercise. Gradually increase the amount you walk every day. You also may want to swim, bike, or do other activities. When you eat out  · Learn to estimate the serving sizes of foods that have carbohydrate. If you measure food at home, it will be easier to estimate the amount in a serving of restaurant food. · If the meal you order has too much carbohydrate (such as potatoes, corn, or baked beans), ask to have a low-carbohydrate food instead. Ask for a salad or green vegetables. · If you use insulin, check your blood sugar before and after eating out to help you plan how much to eat in the future. · If you eat more carbohydrate at a meal than you had planned, take a walk or do other exercise. This will help lower your blood sugar. Where can you learn more? Go to MiniBrake.be  Enter O488 in the search box to learn more about \"Nutrition Tips for Diabetes: After Your Visit. \"   © 3995-9916 Healthwise, Incorporated. Care instructions adapted under license by [facility] (which disclaims liability or warranty for this information). This care instruction is for use with your licensed healthcare professional. If you have questions about a medical condition or this instruction, always ask your healthcare professional. [place] [address] any warranty or liability for your use of this information. Content Version: 13.2.181295; Current as of: June 4, 2014              Nutrition Tips for Diabetes: After Your Visit  Your Care Instructions  A healthy diet is important to manage diabetes.  It helps you lose weight (if you need to) and keep it off. It gives you the nutrition and energy your body needs and helps prevent heart disease. But a diet for diabetes does not mean that you have to eat special foods. You can eat what your family eats, including occasional sweets and other favorites. But you do have to pay attention to how often you eat and how much you eat of certain foods. The right plan for you will give you meals that help you keep your blood sugar at healthy levels. Try to eat a variety of foods and to spread carbohydrate throughout the day. Carbohydrate raises blood sugar higher and more quickly than any other nutrient does. Carbohydrate is found in sugar, breads and cereals, fruit, starchy vegetables such as potatoes and corn, and milk and yogurt. You may want to work with a dietitian or diabetes educator to help you plan meals and snacks. A dietitian or diabetes educator also can help you lose weight if that is one of your goals. The following tips can help you enjoy your meals and stay healthy. Follow-up care is a key part of your treatment and safety. Be sure to make and go to all appointments, and call your doctor if you are having problems. Its also a good idea to know your test results and keep a list of the medicines you take. How can you care for yourself at home? · Learn which foods have carbohydrate and how much carbohydrate to eat. A dietitian or diabetes educator can help you learn to keep track of how much carbohydrate you eat. · Spread carbohydrate throughout the day. Eat some carbohydrate at all meals, but do not eat too much at any one time. · Plan meals to include food from all the food groups. These are the food groups and some example portion sizes:  ¨ Grains: 1 slice of bread (1 ounce), ½ cup of cooked cereal, and 1/3 cup of cooked pasta or rice. These have about 15 grams of carbohydrate in a serving.  Choose whole grains such as whole wheat bread or crackers, oatmeal, and brown rice more often than refined grains. ¨ Fruit: 1 small fresh fruit, such as an apple or orange; ½ of a banana; ½ cup of chopped, cooked, or canned fruit; ½ cup of fruit juice; 1 cup of melon or raspberries; and 2 tablespoons of dried fruit. These have about 15 grams of carbohydrate in a serving. ¨ Dairy: 1 cup of nonfat or low-fat milk and 2/3 cup of plain yogurt. These have about 15 grams of carbohydrate in a serving. ¨ Protein foods: Beef, chicken, turkey, fish, eggs, tofu, cheese, cottage cheese, and peanut butter. A serving size of meat is 3 ounces, which is about the size of a deck of cards. Examples of meat substitute serving sizes (equal to 1 ounce of meat) are 1/4 cup of cottage cheese, 1 egg, 1 tablespoon of peanut butter, and ½ cup of tofu. These have very little or no carbohydrate per serving. ¨ Vegetables: Starchy vegetables such as ½ cup of cooked dried beans, peas, potatoes, or corn have about 15 grams of carbohydrate. Nonstarchy vegetables have very little carbohydrate, such as 1 cup of raw leafy vegetables (such as spinach), ½ cup of other vegetables (cooked or chopped), and 3/4 cup of vegetable juice. · Use the plate format to plan meals. It is a good, quick way to make sure that you have a balanced meal. It also helps you spread carbohydrate throughout the day. You divide your plate by types of foods. Put vegetables on half the plate, meat or meat substitutes on one-quarter of the plate, and a grain or starchy vegetable (such as brown rice or a potato) in the final quarter of the plate. To this you can add a small piece of fruit and 1 cup of milk or yogurt, depending on how much carbohydrate you are supposed to eat at a meal.  · Talk to your dietitian or diabetes educator about ways to add limited amounts of sweets into your meal plan. You can eat these foods now and then, as long as you include the amount of carbohydrate they have in your daily carbohydrate allowance.   · If you drink alcohol, limit it to no more than 1 drink a day for women and 2 drinks a day for men. If you are pregnant, no amount of alcohol is known to be safe. · Protein, fat, and fiber do not raise blood sugar as much as carbohydrate does. If you eat a lot of these nutrients in a meal, your blood sugar will rise more slowly than it would otherwise. · Limit saturated fats, such as those from meat and dairy products. Try to replace it with monounsaturated fat, such as olive oil. This is a healthier choice because people who have diabetes are at higher-than-average risk of heart disease. But use a modest amount of olive oil. A tablespoon of olive oil has 14 grams of fat and 120 calories. · Exercise lowers blood sugar. If you take insulin by shots or pump, you can use less than you would if you were not exercising. Keep in mind that timing matters. If you exercise within 1 hour after a meal, your body may need less insulin for that meal than it would if you exercised 3 hours after the meal. Test your blood sugar to find out how exercise affects your need for insulin. · Exercise on most days of the week. Aim for at least 30 minutes. Exercise helps you stay at a healthy weight and helps your body use insulin. Walking is an easy way to get exercise. Gradually increase the amount you walk every day. You also may want to swim, bike, or do other activities. When you eat out  · Learn to estimate the serving sizes of foods that have carbohydrate. If you measure food at home, it will be easier to estimate the amount in a serving of restaurant food. · If the meal you order has too much carbohydrate (such as potatoes, corn, or baked beans), ask to have a low-carbohydrate food instead. Ask for a salad or green vegetables. · If you use insulin, check your blood sugar before and after eating out to help you plan how much to eat in the future. · If you eat more carbohydrate at a meal than you had planned, take a walk or do other exercise.  This will help lower your blood sugar.   Where can you learn more? Go to Avenso.be  Enter V718 in the search box to learn more about \"Nutrition Tips for Diabetes: After Your Visit. \"   © 1885-7016 Healthwise, Incorporated. Care instructions adapted under license by [employer] (which disclaims liability or warranty for this information). This care instruction is for use with your licensed healthcare professional. If you have questions about a medical condition or this instruction, always ask your healthcare professional. [place] [address] any warranty or liability for your use of this information.   Content Version: 69.6.048052; Current as of: June 4, 2014

## 2018-06-29 NOTE — MR AVS SNAPSHOT
[address] [place] [address] 
[address] [facility] [address] 
[phone] Patient: Lisa Heredia MRN:  [id number] Visit Information Date & Time Provider Department Dept. Phone Encounter #  
 2018  9:00 AM DO Benny FrancisnadiyaThomas [phone] [id number] Follow-up Instructions Return in about 6 months (around 2018) for diabetes, weight, referral follow up. Upcoming Health Maintenance Date Due FOBT Q 1 YEAR AGE 50-75 2018* Influenza Age 5 to Adult 2018 LIPID PANEL Q1 10/20/2018 PAP AKA CERVICAL CYTOLOGY 2018 FOOT EXAM Q1 2018 HEMOGLOBIN A1C Q6M 2018 MICROALBUMIN Q1 2018 EYE EXAM RETINAL OR DILATED Q1 2019 BREAST CANCER SCRN MAMMOGRAM 2019 DTaP/Tdap/Td series (2 - Td) 2027 *Topic was postponed. The date shown is not the original due date. Allergies as of 2018  Review Complete On: 2018 By: Jennifer Ty No Known Allergies Current Immunizations  Reviewed on 2018 Name Date Influenza Vaccine 10/17/2017, 2015 Influenza Vaccine Split 11/3/2011 Influenza Vaccine Whole 10/1/2009 Reviewed by Marlon Coles DO on 2018 at 10:57 AM  
You Were Diagnosed With   
  
 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, without long-term current use of insulin ([facility]    -  Primary ICD-10-CM: Y27.5410 ICD-9-CM: 250.50, 362.04 Mixed dyslipidemia     ICD-10-CM: E78.2 ICD-9-CM: 272.2 Colon cancer screening     ICD-10-CM: Z12.11 ICD-9-CM: V76.51 Weight gain     ICD-10-CM: R63.5 ICD-9-CM: 783.1 13# since 2017 due to diet changes, reduced exercise, vacation last week [facility] Personal history of breast cancer     ICD-10-CM: Z85.3 ICD-9-CM: V10.3 Family history of glaucoma in father     ICD-10-CM: Z80.65 ICD-9-CM: V19.11 Family history of colon cancer     ICD-10-CM: Z80.0 ICD-9-CM: V16.0 Family history of diabetes mellitus (DM)     ICD-10-CM: Z83.3 ICD-9-CM: V18.0 Family history of breast cancer     ICD-10-CM: Z80.3 ICD-9-CM: V16.3 Family history of heart disease     ICD-10-CM: Z82.49 
ICD-9-CM: V17.49 Vitamin D deficiency     ICD-10-CM: E55.9 ICD-9-CM: 268.9 Vitals BP Pulse Temp Resp Height(growth percentile) Weight(growth percentile) 132/84 (BP 1 Location: Right arm, BP Patient Position: Sitting) 64 97.5 °F (36.4 °C) (Oral) 15 5' 2.01\" (1.575 m) 175 lb (79.4 kg) SpO2 BMI OB Status Smoking Status 98% 32 kg/m2 Menopause Never Smoker Vitals History BMI and BSA Data Body Mass Index Body Surface Area 32 kg/m 2 1.86 m 2 Preferred Pharmacy Pharmacy Name Phone [id number] [address] [phone] Your Updated Medication List  
  
   
This list is accurate as of 6/29/18 11:02 AM.  Always use your most recent med list.  
  
  
  
  
 enalapril 2.5 mg tablet Commonly known as:  VASOTEC  
TAKE 1 TABLET DAILY  
  
 ibuprofen 600 mg tablet Commonly known as:  MOTRIN Take 1 Tab by mouth every six (6) hours as needed for Pain. INVOKAMET  mg Tab Generic drug:  canagliflozin-metFORMIN  
TAKE 1 TABLET BY MOUTH DAILY  
  
 rosuvastatin 20 mg tablet Commonly known as:  CRESTOR Take 1 Tab by mouth nightly. Indications: hyperlipidemia VITAMIN D3 1,000 unit Cap Generic drug:  cholecalciferol Take  by mouth daily. Follow-up Instructions Return in about 6 months (around 12/29/2018) for diabetes, weight, referral follow up. Patient Instructions Starting a Weight Loss Plan: Care Instructions Your Care Instructions If you are thinking about losing weight, it can be hard to know where to start. Your doctor can help you set up a weight loss plan that best meets your needs. You may want to take a class on nutrition or exercise, or join a weight loss support group. If you have questions about how to make changes to your eating or exercise habits, ask your doctor about seeing a registered dietitian or an exercise specialist. 
It can be a big challenge to lose weight. But you do not have to make huge changes at once. Make small changes, and stick with them. When those changes become habit, add a few more changes. If you do not think you are ready to make changes right now, try to pick a date in the future. Make an appointment to see your doctor to discuss whether the time is right for you to start a plan. Follow-up care is a key part of your treatment and safety. Be sure to make and go to all appointments, and call your doctor if you are having problems. It's also a good idea to know your test results and keep a list of the medicines you take. How can you care for yourself at home? · Set realistic goals. Many people expect to lose much more weight than is likely. A weight loss of 5% to 10% of your body weight may be enough to improve your health. · Get family and friends involved to provide support. Talk to them about why you are trying to lose weight, and ask them to help. They can help by participating in exercise and having meals with you, even if they may be eating something different. · Find what works best for you. If you do not have time or do not like to cook, a program that offers meal replacement bars or shakes may be better for you. Or if you like to prepare meals, finding a plan that includes daily menus and recipes may be best. 
· Ask your doctor about other health professionals who can help you achieve your weight loss goals. ¨ A dietitian can help you make healthy changes in your diet. ¨ An exercise specialist or  can help you develop a safe and effective exercise program. 
¨ A counselor or psychiatrist can help you cope with issues such as depression, anxiety, or family problems that can make it hard to focus on weight loss. · Consider joining a support group for people who are trying to lose weight. Your doctor can suggest groups in your area. Where can you learn more? Go to http://thania-chong.info/. Enter P950 in the search box to learn more about \"Starting a Weight Loss Plan: Care Instructions. \" Current as of: October 13, 2016 Content Version: 11.4 © 3145-5346 [employer]. Care instructions adapted under license by [employer] (which disclaims liability or warranty for this information). If you have questions about a medical condition or this instruction, always ask your healthcare professional. [address] any warranty or liability for your use of this information. Starting a Weight Loss Plan: Care Instructions Your Care Instructions If you are thinking about losing weight, it can be hard to know where to start. Your doctor can help you set up a weight loss plan that best meets your needs. You may want to take a class on nutrition or exercise, or join a weight loss support group. If you have questions about how to make changes to your eating or exercise habits, ask your doctor about seeing a registered dietitian or an exercise specialist. 
It can be a big challenge to lose weight. But you do not have to make huge changes at once. Make small changes, and stick with them. When those changes become habit, add a few more changes. If you do not think you are ready to make changes right now, try to pick a date in the future. Make an appointment to see your doctor to discuss whether the time is right for you to start a plan. Follow-up care is a key part of your treatment and safety.  Be sure to make and go to all appointments, and call your doctor if you are having problems. It's also a good idea to know your test results and keep a list of the medicines you take. How can you care for yourself at home? · Set realistic goals. Many people expect to lose much more weight than is likely. A weight loss of 5% to 10% of your body weight may be enough to improve your health. · Get family and friends involved to provide support. Talk to them about why you are trying to lose weight, and ask them to help. They can help by participating in exercise and having meals with you, even if they may be eating something different. · Find what works best for you. If you do not have time or do not like to cook, a program that offers meal replacement bars or shakes may be better for you. Or if you like to prepare meals, finding a plan that includes daily menus and recipes may be best. 
· Ask your doctor about other health professionals who can help you achieve your weight loss goals. ¨ A dietitian can help you make healthy changes in your diet. ¨ An exercise specialist or  can help you develop a safe and effective exercise program. 
¨ A counselor or psychiatrist can help you cope with issues such as depression, anxiety, or family problems that can make it hard to focus on weight loss. · Consider joining a support group for people who are trying to lose weight. Your doctor can suggest groups in your area. Where can you learn more? Go to http://thania-chong.info/. Enter K458 in the search box to learn more about \"Starting a Weight Loss Plan: Care Instructions. \" Current as of: October 13, 2016 Content Version: 11.4 © 1322-1482 [employer]. Care instructions adapted under license by [employer] (which disclaims liability or warranty for this information).  If you have questions about a medical condition or this instruction, always ask your healthcare professional. [place] [address] any warranty or liability for your use of this information. Learning About Low-Carbohydrate Diets for Weight Loss What is a low-carbohydrate diet? Low-carb diets avoid foods that are high in carbohydrate. These high-carb foods include pasta, bread, rice, cereal, fruits, and starchy vegetables. Instead, these diets usually have you eat foods that are high in fat and protein. Many people lose weight quickly on a low-carb diet. But the early weight loss is water. People on this diet often gain the weight back after they start eating carbs again. Not all diet plans are safe or work well. A lot of the evidence shows that low-carb diets aren't healthy. That's because these diets often don't include healthy foods like fruits and vegetables. Losing weight safely means balancing protein, fat, and carbs with every meal and snack. And low-carb diets don't always provide the vitamins, minerals, and fiber you need. If you have a serious medical condition, talk to your doctor before you try any diet. These conditions include kidney disease, heart disease, type 2 diabetes, high cholesterol, and high blood pressure. If you are pregnant, it may not be safe for your baby if you are on a low-carb diet. How can you lose weight safely? You might have heard that a diet plan helped another person lose weight. But that doesn't mean that it will work for you. It is very hard to stay on a diet that includes lots of big changes in your eating habits. If you want to get to a healthy weight and stay there, making healthy lifestyle changes will often work better than dieting. These steps can help. · Make a plan for change. Work with your doctor to create a plan that is right for you. · See a dietitian. He or she can show you how to make healthy changes in your eating habits. · Manage stress. If you have a lot of stress in your life, it can be hard to focus on making healthy changes to your daily habits. · Track your food and activity. You are likely to do better at losing weight if you keep track of what you eat and what you do. Follow-up care is a key part of your treatment and safety. Be sure to make and go to all appointments, and call your doctor if you are having problems. It's also a good idea to know your test results and keep a list of the medicines you take. Where can you learn more? Go to http://thaniaR&M Engineeringchong.info/. Enter A121 in the search box to learn more about \"Learning About Low-Carbohydrate Diets for Weight Loss. \" Current as of: May 12, 2017 Content Version: 11.4 © 7080-8462 [employer]. Care instructions adapted under license by [employer] (which disclaims liability or warranty for this information). If you have questions about a medical condition or this instruction, always ask your healthcare professional. [place] [address] any warranty or liability for your use of this information. Recommend AHA new dietary guidelines to improve weight, cardiovascular and general health. Limit daily added sugar consumption to 6 tsp/25 grams/100 blanka/daily for women and 9 tsp/37 grams/150 blanka/daily for men. 1 tsp= 4 g 
 
Nutrition Tips for Diabetes: After Your Visit Your Care Instructions A healthy diet is important to manage diabetes. It helps you lose weight (if you need to) and keep it off. It gives you the nutrition and energy your body needs and helps prevent heart disease. But a diet for diabetes does not mean that you have to eat special foods. You can eat what your family eats, including occasional sweets and other favorites. But you do have to pay attention to how often you eat and how much you eat of certain foods.  The right plan for you will give you meals that help you keep your blood sugar at healthy levels. Try to eat a variety of foods and to spread carbohydrate throughout the day. Carbohydrate raises blood sugar higher and more quickly than any other nutrient does. Carbohydrate is found in sugar, breads and cereals, fruit, starchy vegetables such as potatoes and corn, and milk and yogurt. You may want to work with a dietitian or diabetes educator to help you plan meals and snacks. A dietitian or diabetes educator also can help you lose weight if that is one of your goals. The following tips can help you enjoy your meals and stay healthy. Follow-up care is a key part of your treatment and safety. Be sure to make and go to all appointments, and call your doctor if you are having problems. Its also a good idea to know your test results and keep a list of the medicines you take. How can you care for yourself at home? · Learn which foods have carbohydrate and how much carbohydrate to eat. A dietitian or diabetes educator can help you learn to keep track of how much carbohydrate you eat. · Spread carbohydrate throughout the day. Eat some carbohydrate at all meals, but do not eat too much at any one time. · Plan meals to include food from all the food groups. These are the food groups and some example portion sizes: ¨ Grains: 1 slice of bread (1 ounce), ½ cup of cooked cereal, and 1/3 cup of cooked pasta or rice. These have about 15 grams of carbohydrate in a serving. Choose whole grains such as whole wheat bread or crackers, oatmeal, and brown rice more often than refined grains. ¨ Fruit: 1 small fresh fruit, such as an apple or orange; ½ of a banana; ½ cup of chopped, cooked, or canned fruit; ½ cup of fruit juice; 1 cup of melon or raspberries; and 2 tablespoons of dried fruit. These have about 15 grams of carbohydrate in a serving. ¨ Dairy: 1 cup of nonfat or low-fat milk and 2/3 cup of plain yogurt. These have about 15 grams of carbohydrate in a serving. ¨ Protein foods: Beef, chicken, turkey, fish, eggs, tofu, cheese, cottage cheese, and peanut butter. A serving size of meat is 3 ounces, which is about the size of a deck of cards. Examples of meat substitute serving sizes (equal to 1 ounce of meat) are 1/4 cup of cottage cheese, 1 egg, 1 tablespoon of peanut butter, and ½ cup of tofu. These have very little or no carbohydrate per serving. ¨ Vegetables: Starchy vegetables such as ½ cup of cooked dried beans, peas, potatoes, or corn have about 15 grams of carbohydrate. Nonstarchy vegetables have very little carbohydrate, such as 1 cup of raw leafy vegetables (such as spinach), ½ cup of other vegetables (cooked or chopped), and 3/4 cup of vegetable juice. · Use the plate format to plan meals. It is a good, quick way to make sure that you have a balanced meal. It also helps you spread carbohydrate throughout the day. You divide your plate by types of foods. Put vegetables on half the plate, meat or meat substitutes on one-quarter of the plate, and a grain or starchy vegetable (such as brown rice or a potato) in the final quarter of the plate. To this you can add a small piece of fruit and 1 cup of milk or yogurt, depending on how much carbohydrate you are supposed to eat at a meal. 
· Talk to your dietitian or diabetes educator about ways to add limited amounts of sweets into your meal plan. You can eat these foods now and then, as long as you include the amount of carbohydrate they have in your daily carbohydrate allowance. · If you drink alcohol, limit it to no more than 1 drink a day for women and 2 drinks a day for men. If you are pregnant, no amount of alcohol is known to be safe. · Protein, fat, and fiber do not raise blood sugar as much as carbohydrate does. If you eat a lot of these nutrients in a meal, your blood sugar will rise more slowly than it would otherwise. · Limit saturated fats, such as those from meat and dairy products.  Try to replace it with monounsaturated fat, such as olive oil. This is a healthier choice because people who have diabetes are at higher-than-average risk of heart disease. But use a modest amount of olive oil. A tablespoon of olive oil has 14 grams of fat and 120 calories. · Exercise lowers blood sugar. If you take insulin by shots or pump, you can use less than you would if you were not exercising. Keep in mind that timing matters. If you exercise within 1 hour after a meal, your body may need less insulin for that meal than it would if you exercised 3 hours after the meal. Test your blood sugar to find out how exercise affects your need for insulin. · Exercise on most days of the week. Aim for at least 30 minutes. Exercise helps you stay at a healthy weight and helps your body use insulin. Walking is an easy way to get exercise. Gradually increase the amount you walk every day. You also may want to swim, bike, or do other activities. When you eat out · Learn to estimate the serving sizes of foods that have carbohydrate. If you measure food at home, it will be easier to estimate the amount in a serving of restaurant food. · If the meal you order has too much carbohydrate (such as potatoes, corn, or baked beans), ask to have a low-carbohydrate food instead. Ask for a salad or green vegetables. · If you use insulin, check your blood sugar before and after eating out to help you plan how much to eat in the future. · If you eat more carbohydrate at a meal than you had planned, take a walk or do other exercise. This will help lower your blood sugar. Where can you learn more? Go to [employer].be Enter A708 in the search box to learn more about \"Nutrition Tips for Diabetes: After Your Visit. \"  
© 4524-3436 [employer].  Care instructions adapted under license by Leny William (which disclaims liability or warranty for this information). This care instruction is for use with your licensed healthcare professional. If you have questions about a medical condition or this instruction, always ask your healthcare professional. [address] any warranty or liability for your use of this information. Content Version: 69.7.955757; Current as of: June 4, 2014 WEIGHT LOSS PLAN 1. Read food labels and count calories. Goal 5051-1393 calories daily for women and 4730-0611 calories daily for men. Achieve this by decreasing caloric intake by 500 calories by weekly increments. NOTE:  1 pound = 3500 calories! Www.loseit. [employer] Www.BOOM! Entertainmentnesspal.[employer] Www.J[employer]amp [employer]. [employer] Www.[employer]. gov Weight watchers mobile Calories needed to lose 1-2 pounds a week = 10 x your weight in pounds 2. Increase water intake. Per [facility] Weight loss Program, it is important to drink 1/2 your body weight in ounces of water daily. Decrease your water consumption 2-3 hours before bedtime to prevent sleep disturbance from frequent urination. 3.  Decrease sugary beverages. Each can or glass of soda increases your risk of obesity by 60%. Can lose 10 pounds in a month by avoiding any soda. 12 oz can of soda = 140 calories, 16 oz cup of sweet tea = 200 calories 16 oz orange juice = 200 calories, 10 oz apple juice = 150 calories 32 oz sports drink = 200 calories, 16 oz punch = 240 calories 3.5 oz alcohol = 100-150 calories 4. Avoid High Fructose Corn Syrup products. This ingredient makes products highly addictive! 5. Exercise 30 minutes daily 5 days weekly, minimally. If you burn 3500 calories that equals a pound! Use a pedometer to count steps. Visit www. [employer]. [employer] for a free pedometer and diet recommendations. Your maximum heart rate = 220 - your age Never exercise at your maximum heart rate. See handout for target heart rate. 6.  Decrease carbohydrates (white bread, pasta, rice, potatoes, sweet foods and sweet drinks like soda, tea, coffee, juice and sports drinks). Increase fiber and protein. Goal:   calories daily of carbohydrates Try CHROMIUM PICONATE 200 MG THREE TIMES DAILY,  
 to decrease Premenstrual carbohydrate cravings. 7.  Eat 3-5 small meals daily, include lots of protein (beans/legumes, nuts, lean meat, eggs) and green vegetables with each. (Breakfast, lunch, dinner with 2 healthy snacks) 8. Get proper rest 7-8 hours uninterrupted. When you get less than 6 hours, it triggers hunger by affecting your Grehlin:Leptin ratio and this results in weight gain. 9.  Watch your food portions. Green leafy vegetables should cover 1/2 of the plate, lean meat 1/4 of the plate, starchy vegetable 1/4 of the plate. Use smaller plates. 10.  Do not eat until you are full. Eat until you are no longer hungry. If you are not sure, try drinking a glass of water before getting your second serving of food. 11.  Do not weigh yourself daily. Wait until your next office visit. Use how you feel and  how your clothes fit as measurements of success. 12.  Address your spirituality to draw strength from above during your journey. Remember \"I am fearfully and wonderfully made. Marvelous in His eyes. \" 
 
13. Set realistic, appropriate and achievable weight loss goals: 
 
 RECOMMENDED TARGET WEIGHT LOSS:  Initial weight loss of 5-10% of your initial body weight achieved over 6 months or a decrease of 2 BMI units. MINIMUM GOAL OF WEIGHT LOSS:  Reduce body weight and maintain a lower body weight. Prevent weight gain. RELATED WEBSITES:     
Www.obesityaction. org 
(and consider joining the Obesity Action Coalition for $25/year. The [address] mission is to elevated and empower those affected by obesity through education, advocacy and support. Quarterly journals included in membership fee. ) Www.HealthCare.com. [place] 
www.Neumitra.Chatty RELATED DIETS:  Dr. Haven Busby Weight loss challenge, Mediterranean diet, [place] Diet, [place] Bess Watchers, Paleo Diet (anti-inflammatory diet) Emma draper Nutrition Tips for Diabetes: After Your Visit Your Care Instructions A healthy diet is important to manage diabetes. It helps you lose weight (if you need to) and keep it off. It gives you the nutrition and energy your body needs and helps prevent heart disease. But a diet for diabetes does not mean that you have to eat special foods. You can eat what your family eats, including occasional sweets and other favorites. But you do have to pay attention to how often you eat and how much you eat of certain foods. The right plan for you will give you meals that help you keep your blood sugar at healthy levels. Try to eat a variety of foods and to spread carbohydrate throughout the day. Carbohydrate raises blood sugar higher and more quickly than any other nutrient does. Carbohydrate is found in sugar, breads and cereals, fruit, starchy vegetables such as potatoes and corn, and milk and yogurt. You may want to work with a dietitian or diabetes educator to help you plan meals and snacks. A dietitian or diabetes educator also can help you lose weight if that is one of your goals. The following tips can help you enjoy your meals and stay healthy. Follow-up care is a key part of your treatment and safety. Be sure to make and go to all appointments, and call your doctor if you are having problems. Its also a good idea to know your test results and keep a list of the medicines you take. How can you care for yourself at home? · Learn which foods have carbohydrate and how much carbohydrate to eat. A dietitian or diabetes educator can help you learn to keep track of how much carbohydrate you eat. · Spread carbohydrate throughout the day.  Eat some carbohydrate at all meals, but do not eat too much at any one time. · Plan meals to include food from all the food groups. These are the food groups and some example portion sizes: ¨ Grains: 1 slice of bread (1 ounce), ½ cup of cooked cereal, and 1/3 cup of cooked pasta or rice. These have about 15 grams of carbohydrate in a serving. Choose whole grains such as whole wheat bread or crackers, oatmeal, and brown rice more often than refined grains. ¨ Fruit: 1 small fresh fruit, such as an apple or orange; ½ of a banana; ½ cup of chopped, cooked, or canned fruit; ½ cup of fruit juice; 1 cup of melon or raspberries; and 2 tablespoons of dried fruit. These have about 15 grams of carbohydrate in a serving. ¨ Dairy: 1 cup of nonfat or low-fat milk and 2/3 cup of plain yogurt. These have about 15 grams of carbohydrate in a serving. ¨ Protein foods: Beef, chicken, turkey, fish, eggs, tofu, cheese, cottage cheese, and peanut butter. A serving size of meat is 3 ounces, which is about the size of a deck of cards. Examples of meat substitute serving sizes (equal to 1 ounce of meat) are 1/4 cup of cottage cheese, 1 egg, 1 tablespoon of peanut butter, and ½ cup of tofu. These have very little or no carbohydrate per serving. ¨ Vegetables: Starchy vegetables such as ½ cup of cooked dried beans, peas, potatoes, or corn have about 15 grams of carbohydrate. Nonstarchy vegetables have very little carbohydrate, such as 1 cup of raw leafy vegetables (such as spinach), ½ cup of other vegetables (cooked or chopped), and 3/4 cup of vegetable juice. · Use the plate format to plan meals. It is a good, quick way to make sure that you have a balanced meal. It also helps you spread carbohydrate throughout the day. You divide your plate by types of foods.  Put vegetables on half the plate, meat or meat substitutes on one-quarter of the plate, and a grain or starchy vegetable (such as brown rice or a potato) in the final quarter of the plate. To this you can add a small piece of fruit and 1 cup of milk or yogurt, depending on how much carbohydrate you are supposed to eat at a meal. 
· Talk to your dietitian or diabetes educator about ways to add limited amounts of sweets into your meal plan. You can eat these foods now and then, as long as you include the amount of carbohydrate they have in your daily carbohydrate allowance. · If you drink alcohol, limit it to no more than 1 drink a day for women and 2 drinks a day for men. If you are pregnant, no amount of alcohol is known to be safe. · Protein, fat, and fiber do not raise blood sugar as much as carbohydrate does. If you eat a lot of these nutrients in a meal, your blood sugar will rise more slowly than it would otherwise. · Limit saturated fats, such as those from meat and dairy products. Try to replace it with monounsaturated fat, such as olive oil. This is a healthier choice because people who have diabetes are at higher-than-average risk of heart disease. But use a modest amount of olive oil. A tablespoon of olive oil has 14 grams of fat and 120 calories. · Exercise lowers blood sugar. If you take insulin by shots or pump, you can use less than you would if you were not exercising. Keep in mind that timing matters. If you exercise within 1 hour after a meal, your body may need less insulin for that meal than it would if you exercised 3 hours after the meal. Test your blood sugar to find out how exercise affects your need for insulin. · Exercise on most days of the week. Aim for at least 30 minutes. Exercise helps you stay at a healthy weight and helps your body use insulin. Walking is an easy way to get exercise. Gradually increase the amount you walk every day. You also may want to swim, bike, or do other activities. When you eat out · Learn to estimate the serving sizes of foods that have carbohydrate.  If you measure food at home, it will be easier to estimate the amount in a serving of restaurant food. · If the meal you order has too much carbohydrate (such as potatoes, corn, or baked beans), ask to have a low-carbohydrate food instead. Ask for a salad or green vegetables. · If you use insulin, check your blood sugar before and after eating out to help you plan how much to eat in the future. · If you eat more carbohydrate at a meal than you had planned, take a walk or do other exercise. This will help lower your blood sugar. Where can you learn more? Go to OpenDrive.be Enter G589 in the search box to learn more about \"Nutrition Tips for Diabetes: After Your Visit. \"  
© 0540-6907 Healthwise, Incorporated. Care instructions adapted under license by Rogelio Rai (which disclaims liability or warranty for this information). This care instruction is for use with your licensed healthcare professional. If you have questions about a medical condition or this instruction, always ask your healthcare professional. [address] any warranty or liability for your use of this information. Content Version: 01.5.314521; Current as of: June 4, 2014 Nutrition Tips for Diabetes: After Your Visit Your Care Instructions A healthy diet is important to manage diabetes. It helps you lose weight (if you need to) and keep it off. It gives you the nutrition and energy your body needs and helps prevent heart disease. But a diet for diabetes does not mean that you have to eat special foods. You can eat what your family eats, including occasional sweets and other favorites. But you do have to pay attention to how often you eat and how much you eat of certain foods. The right plan for you will give you meals that help you keep your blood sugar at healthy levels.  
Try to eat a variety of foods and to spread carbohydrate throughout the day. Carbohydrate raises blood sugar higher and more quickly than any other nutrient does. Carbohydrate is found in sugar, breads and cereals, fruit, starchy vegetables such as potatoes and corn, and milk and yogurt. You may want to work with a dietitian or diabetes educator to help you plan meals and snacks. A dietitian or diabetes educator also can help you lose weight if that is one of your goals. The following tips can help you enjoy your meals and stay healthy. Follow-up care is a key part of your treatment and safety. Be sure to make and go to all appointments, and call your doctor if you are having problems. Its also a good idea to know your test results and keep a list of the medicines you take. How can you care for yourself at home? · Learn which foods have carbohydrate and how much carbohydrate to eat. A dietitian or diabetes educator can help you learn to keep track of how much carbohydrate you eat. · Spread carbohydrate throughout the day. Eat some carbohydrate at all meals, but do not eat too much at any one time. · Plan meals to include food from all the food groups. These are the food groups and some example portion sizes: ¨ Grains: 1 slice of bread (1 ounce), ½ cup of cooked cereal, and 1/3 cup of cooked pasta or rice. These have about 15 grams of carbohydrate in a serving. Choose whole grains such as whole wheat bread or crackers, oatmeal, and brown rice more often than refined grains. ¨ Fruit: 1 small fresh fruit, such as an apple or orange; ½ of a banana; ½ cup of chopped, cooked, or canned fruit; ½ cup of fruit juice; 1 cup of melon or raspberries; and 2 tablespoons of dried fruit. These have about 15 grams of carbohydrate in a serving. ¨ Dairy: 1 cup of nonfat or low-fat milk and 2/3 cup of plain yogurt. These have about 15 grams of carbohydrate in a serving.  
¨ Protein foods: Beef, chicken, turkey, fish, eggs, tofu, cheese, cottage cheese, and peanut butter. A serving size of meat is 3 ounces, which is about the size of a deck of cards. Examples of meat substitute serving sizes (equal to 1 ounce of meat) are 1/4 cup of cottage cheese, 1 egg, 1 tablespoon of peanut butter, and ½ cup of tofu. These have very little or no carbohydrate per serving. ¨ Vegetables: Starchy vegetables such as ½ cup of cooked dried beans, peas, potatoes, or corn have about 15 grams of carbohydrate. Nonstarchy vegetables have very little carbohydrate, such as 1 cup of raw leafy vegetables (such as spinach), ½ cup of other vegetables (cooked or chopped), and 3/4 cup of vegetable juice. · Use the plate format to plan meals. It is a good, quick way to make sure that you have a balanced meal. It also helps you spread carbohydrate throughout the day. You divide your plate by types of foods. Put vegetables on half the plate, meat or meat substitutes on one-quarter of the plate, and a grain or starchy vegetable (such as brown rice or a potato) in the final quarter of the plate. To this you can add a small piece of fruit and 1 cup of milk or yogurt, depending on how much carbohydrate you are supposed to eat at a meal. 
· Talk to your dietitian or diabetes educator about ways to add limited amounts of sweets into your meal plan. You can eat these foods now and then, as long as you include the amount of carbohydrate they have in your daily carbohydrate allowance. · If you drink alcohol, limit it to no more than 1 drink a day for women and 2 drinks a day for men. If you are pregnant, no amount of alcohol is known to be safe. · Protein, fat, and fiber do not raise blood sugar as much as carbohydrate does. If you eat a lot of these nutrients in a meal, your blood sugar will rise more slowly than it would otherwise. · Limit saturated fats, such as those from meat and dairy products. Try to replace it with monounsaturated fat, such as olive oil.  This is a healthier choice because people who have diabetes are at higher-than-average risk of heart disease. But use a modest amount of olive oil. A tablespoon of olive oil has 14 grams of fat and 120 calories. · Exercise lowers blood sugar. If you take insulin by shots or pump, you can use less than you would if you were not exercising. Keep in mind that timing matters. If you exercise within 1 hour after a meal, your body may need less insulin for that meal than it would if you exercised 3 hours after the meal. Test your blood sugar to find out how exercise affects your need for insulin. · Exercise on most days of the week. Aim for at least 30 minutes. Exercise helps you stay at a healthy weight and helps your body use insulin. Walking is an easy way to get exercise. Gradually increase the amount you walk every day. You also may want to swim, bike, or do other activities. When you eat out · Learn to estimate the serving sizes of foods that have carbohydrate. If you measure food at home, it will be easier to estimate the amount in a serving of restaurant food. · If the meal you order has too much carbohydrate (such as potatoes, corn, or baked beans), ask to have a low-carbohydrate food instead. Ask for a salad or green vegetables. · If you use insulin, check your blood sugar before and after eating out to help you plan how much to eat in the future. · If you eat more carbohydrate at a meal than you had planned, take a walk or do other exercise. This will help lower your blood sugar. Where can you learn more? Go to Variable.be Enter S167 in the search box to learn more about \"Nutrition Tips for Diabetes: After Your Visit. \"  
© 3755-9071 Healthwise, Incorporated. Care instructions adapted under license by Ying Nash (which disclaims liability or warranty for this information).  This care instruction is for use with your licensed healthcare professional. If you have questions about a medical condition or this instruction, always ask your healthcare professional. [address] any warranty or liability for your use of this information. Content Version: 84.1.926812; Current as of: June 4, 2014 Introducing [facility]! Dear Rodolfo Ordoñez: 
Thank you for requesting a RailRunner account. Our records indicate that you already have an active RailRunner account. You can access your account anytime at [email or web address] Did you know that you can access your hospital and ER discharge instructions at any time in RailRunner? You can also review all of your test results from your hospital stay or ER visit. Additional Information If you have questions, please visit the Frequently Asked Questions section of the RailRunner website at [email or web address] Remember, RailRunner is NOT to be used for urgent needs. For medical emergencies, dial 911. Now available from your iPhone and Android! Please provide this summary of care documentation to your next provider. Your primary care clinician is listed as Leanne Willett. If you have any questions after today's visit, please call [phone].

## 2018-06-29 NOTE — PROGRESS NOTES
Angelito Ramos  Identified pt with two pt identifiers(name and ). Chief Complaint   Patient presents with    Weight Management    Diabetes    Labs    Epistaxis     has had a few herson nose bleeds       1. Have you been to the ER, urgent care clinic since your last visit? Hospitalized since your last visit? No    2. Have you seen or consulted any other health care providers outside of the [address] [place] [address] since your last visit? Include any pap smears or colon screening. Dr. Kanu Slaughter, and Dr. Maya Das    In the event something were to happen to you and you were unable to speak on your behalf, do you have an Advance Directive/ Living Will in place stating your wishes? YES    If yes, do we have a copy on file NO    If no, would you like information:   Pt encouraged to bring into office. Medication reconciliation up to date and corrected with patient at this time. Today's provider has been notified of reason for visit, vitals and flowsheets obtained on patients. Reviewed record in preparation for visit, huddled with provider and have obtained necessary documentation.       Health Maintenance Due   Topic    FOBT Q 1 YEAR AGE 50-75     HEMOGLOBIN A1C Q6M        Wt Readings from Last 3 Encounters:   18 175 lb (79.4 kg)   17 168 lb 3.2 oz (76.3 kg)   17 162 lb 3.2 oz (73.6 kg)     Temp Readings from Last 3 Encounters:   17 97.5 °F (36.4 °C) (Oral)   17 98.1 °F (36.7 °C) (Oral)   17 97.6 °F (36.4 °C)     BP Readings from Last 3 Encounters:   17 116/79   17 109/72   17 164/85     Pulse Readings from Last 3 Encounters:   17 68   17 66   17 81     Vitals:    18 0908   BP: 132/84   Pulse: 64   Resp: 15   Temp: 97.5 °F (36.4 °C)   TempSrc: Oral   SpO2: 98%   Weight: 175 lb (79.4 kg)   Height: 5' 2.01\" (1.575 m)   PainSc:   0 - No pain         Learning Assessment:  :     Learning Assessment 2016   PRIMARY LEARNER Patient   HIGHEST LEVEL OF EDUCATION - PRIMARY LEARNER  4 YEARS OF COLLEGE   BARRIERS PRIMARY LEARNER NONE   CO-LEARNER CAREGIVER No   PRIMARY LANGUAGE ENGLISH   LEARNER PREFERENCE PRIMARY READING     VIDEOS   ANSWERED BY Patient   RELATIONSHIP SELF       Depression Screening:  :     PHQ over the last two weeks 6/29/2018   PHQ Not Done -   Little interest or pleasure in doing things Not at all   Feeling down, depressed or hopeless Not at all   Total Score PHQ 2 0       Fall Risk Assessment:  :     Fall Risk Assessment, last 12 mths 6/29/2018   Able to walk? Yes   Fall in past 12 months? No       Abuse Screening:  :     Abuse Screening Questionnaire 8/1/2016   Do you ever feel afraid of your partner? N   Are you in a relationship with someone who physically or mentally threatens you? N   Is it safe for you to go home?  Y       ADL Screening:  :     ADL Assessment 6/29/2018   Feeding yourself No Help Needed   Getting from bed to chair No Help Needed   Getting dressed No Help Needed   Bathing or showering No Help Needed   Walk across the room (includes cane/walker) No Help Needed   Using the telphone No Help Needed   Taking your medications No Help Needed   Preparing meals No Help Needed   Managing money (expenses/bills) No Help Needed   Moderately strenuous housework (laundry) No Help Needed   Shopping for personal items (toiletries/medicines) No Help Needed   Shopping for groceries No Help Needed   Driving No Help Needed   Climbing a flight of stairs No Help Needed   Getting to places beyond walking distances No Help Needed

## 2018-06-30 LAB
25(OH)D3+25(OH)D2 SERPL-MCNC: 39.5 NG/ML (ref 30–100)
ALBUMIN SERPL-MCNC: 4.6 G/DL (ref 3.5–5.5)
ALBUMIN/CREAT UR: <8 MG/G CREAT (ref 0–30)
ALBUMIN/GLOB SERPL: 1.8 {RATIO} (ref 1.2–2.2)
ALP SERPL-CCNC: 83 IU/L (ref 39–117)
ALT SERPL-CCNC: 22 IU/L (ref 0–32)
APPEARANCE UR: CLEAR
AST SERPL-CCNC: 19 IU/L (ref 0–40)
BILIRUB SERPL-MCNC: 0.3 MG/DL (ref 0–1.2)
BILIRUB UR QL STRIP: NEGATIVE
BUN SERPL-MCNC: 17 MG/DL (ref 6–24)
BUN/CREAT SERPL: 26 (ref 9–23)
CALCIUM SERPL-MCNC: 9.5 MG/DL (ref 8.7–10.2)
CHLORIDE SERPL-SCNC: 97 MMOL/L (ref 96–106)
CHOLEST SERPL-MCNC: 264 MG/DL (ref 100–199)
CO2 SERPL-SCNC: 23 MMOL/L (ref 20–29)
COLOR UR: YELLOW
CREAT SERPL-MCNC: 0.66 MG/DL (ref 0.57–1)
CREAT UR-MCNC: 37.5 MG/DL
EST. AVERAGE GLUCOSE BLD GHB EST-MCNC: 128 MG/DL
GLOBULIN SER CALC-MCNC: 2.5 G/DL (ref 1.5–4.5)
GLUCOSE SERPL-MCNC: 107 MG/DL (ref 65–99)
GLUCOSE UR QL: ABNORMAL
HBA1C MFR BLD: 6.1 % (ref 4.8–5.6)
HDLC SERPL-MCNC: 40 MG/DL
HGB UR QL STRIP: NEGATIVE
INTERPRETATION, 910389: NORMAL
KETONES UR QL STRIP: NEGATIVE
LDLC SERPL CALC-MCNC: 187 MG/DL (ref 0–99)
LEUKOCYTE ESTERASE UR QL STRIP: NEGATIVE
Lab: NORMAL
MICRO URNS: ABNORMAL
MICROALBUMIN UR-MCNC: <3 UG/ML
NITRITE UR QL STRIP: NEGATIVE
PH UR STRIP: 5.5 [PH] (ref 5–7.5)
POTASSIUM SERPL-SCNC: 4.3 MMOL/L (ref 3.5–5.2)
PROT SERPL-MCNC: 7.1 G/DL (ref 6–8.5)
PROT UR QL STRIP: NEGATIVE
SODIUM SERPL-SCNC: 138 MMOL/L (ref 134–144)
SP GR UR: 1.02 (ref 1–1.03)
TRIGL SERPL-MCNC: 184 MG/DL (ref 0–149)
TSH SERPL DL<=0.005 MIU/L-ACNC: 4.09 UIU/ML (ref 0.45–4.5)
UROBILINOGEN UR STRIP-MCNC: 0.2 MG/DL (ref 0.2–1)
VLDLC SERPL CALC-MCNC: 37 MG/DL (ref 5–40)

## 2018-09-10 DIAGNOSIS — E11.9 CONTROLLED TYPE 2 DIABETES MELLITUS WITHOUT COMPLICATION, WITHOUT LONG-TERM CURRENT USE OF INSULIN (HCC): ICD-10-CM

## 2018-09-10 NOTE — TELEPHONE ENCOUNTER
PCP: Mary Pretty,     Last appt: 6/29/2018  No future appointments.     Requested Prescriptions     Pending Prescriptions Disp Refills    INVOKAMET  mg tab [Pharmacy Med Name: INVOKAMET  MG TABLET] 30 Tab 2     Sig: TAKE 1 TABLET BY MOUTH DAILY       Prior labs and Blood pressures:  BP Readings from Last 3 Encounters:   06/29/18 132/84   12/29/17 116/79   06/29/17 109/72     Lab Results   Component Value Date/Time    Sodium 138 06/29/2018 09:40 AM    Potassium 4.3 06/29/2018 09:40 AM    Chloride 97 06/29/2018 09:40 AM    CO2 23 06/29/2018 09:40 AM    Anion gap 8 01/22/2010 08:30 AM    Glucose 107 (H) 06/29/2018 09:40 AM    BUN 17 06/29/2018 09:40 AM    Creatinine 0.66 06/29/2018 09:40 AM    BUN/Creatinine ratio 26 (H) 06/29/2018 09:40 AM    GFR est  06/29/2018 09:40 AM    GFR est non- 06/29/2018 09:40 AM    Calcium 9.5 06/29/2018 09:40 AM     Lab Results   Component Value Date/Time    Hemoglobin A1c 6.1 (H) 06/29/2018 09:40 AM    Hemoglobin A1c (POC) 6.0 12/29/2017 10:15 AM     Lab Results   Component Value Date/Time    Cholesterol, total 264 (H) 06/29/2018 09:40 AM    HDL Cholesterol 40 06/29/2018 09:40 AM    LDL, calculated 187 (H) 06/29/2018 09:40 AM    VLDL, calculated 37 06/29/2018 09:40 AM    Triglyceride 184 (H) 06/29/2018 09:40 AM    CHOL/HDL Ratio 6.5 (H) 01/22/2010 08:30 AM     Lab Results   Component Value Date/Time    Vitamin D 25-Hydroxy 25.2 (L) 09/27/2011 08:00 AM    VITAMIN D, 25-HYDROXY 39.5 06/29/2018 09:40 AM       Lab Results   Component Value Date/Time    TSH 4.090 06/29/2018 09:40 AM

## 2018-09-13 RX ORDER — CANAGLIFLOZIN AND METFORMIN HYDROCHLORIDE 50; 500 MG/1; MG/1
TABLET, FILM COATED ORAL
Qty: 30 TAB | Refills: 2 | Status: SHIPPED | OUTPATIENT
Start: 2018-09-13 | End: 2019-01-12 | Stop reason: SDUPTHER

## 2019-01-14 DIAGNOSIS — E78.2 MIXED DYSLIPIDEMIA: ICD-10-CM

## 2019-01-14 NOTE — TELEPHONE ENCOUNTER
PCP: Markus Nieves DO    Last appt: 6/29/2018  No future appointments.     Requested Prescriptions     Pending Prescriptions Disp Refills    rosuvastatin (CRESTOR) 20 mg tablet [Pharmacy Med Name: ROSUVASTATIN CALCIUM 20 MG TAB] 90 Tab 1     Sig: TAKE 1 TABLET BY MOUTH NIGHTLY       Prior labs and Blood pressures:  BP Readings from Last 3 Encounters:   06/29/18 132/84   12/29/17 116/79   06/29/17 109/72     Lab Results   Component Value Date/Time    Sodium 138 06/29/2018 09:40 AM    Potassium 4.3 06/29/2018 09:40 AM    Chloride 97 06/29/2018 09:40 AM    CO2 23 06/29/2018 09:40 AM    Anion gap 8 01/22/2010 08:30 AM    Glucose 107 (H) 06/29/2018 09:40 AM    BUN 17 06/29/2018 09:40 AM    Creatinine 0.66 06/29/2018 09:40 AM    BUN/Creatinine ratio 26 (H) 06/29/2018 09:40 AM    GFR est  06/29/2018 09:40 AM    GFR est non- 06/29/2018 09:40 AM    Calcium 9.5 06/29/2018 09:40 AM     Lab Results   Component Value Date/Time    Hemoglobin A1c 6.1 (H) 06/29/2018 09:40 AM    Hemoglobin A1c (POC) 6.0 12/29/2017 10:15 AM     Lab Results   Component Value Date/Time    Cholesterol, total 264 (H) 06/29/2018 09:40 AM    HDL Cholesterol 40 06/29/2018 09:40 AM    LDL, calculated 187 (H) 06/29/2018 09:40 AM    VLDL, calculated 37 06/29/2018 09:40 AM    Triglyceride 184 (H) 06/29/2018 09:40 AM    CHOL/HDL Ratio 6.5 (H) 01/22/2010 08:30 AM     Lab Results   Component Value Date/Time    Vitamin D 25-Hydroxy 25.2 (L) 09/27/2011 08:00 AM    VITAMIN D, 25-HYDROXY 39.5 06/29/2018 09:40 AM       Lab Results   Component Value Date/Time    TSH 4.090 06/29/2018 09:40 AM

## 2019-01-17 RX ORDER — ROSUVASTATIN CALCIUM 20 MG/1
TABLET, COATED ORAL
Qty: 90 TAB | Refills: 1 | Status: SHIPPED | OUTPATIENT
Start: 2019-01-17 | End: 2019-02-18

## 2019-02-11 ENCOUNTER — OFFICE VISIT (OUTPATIENT)
Dept: FAMILY MEDICINE CLINIC | Age: 54
End: 2019-02-11

## 2019-02-11 VITALS
HEIGHT: 62 IN | HEART RATE: 83 BPM | DIASTOLIC BLOOD PRESSURE: 78 MMHG | SYSTOLIC BLOOD PRESSURE: 120 MMHG | RESPIRATION RATE: 20 BRPM | WEIGHT: 178.3 LBS | TEMPERATURE: 97.3 F | BODY MASS INDEX: 32.81 KG/M2 | OXYGEN SATURATION: 99 %

## 2019-02-11 DIAGNOSIS — R53.81 MALAISE AND FATIGUE: ICD-10-CM

## 2019-02-11 DIAGNOSIS — R19.7 DIARRHEA, UNSPECIFIED TYPE: ICD-10-CM

## 2019-02-11 DIAGNOSIS — Z83.3 FAMILY HISTORY OF DIABETES MELLITUS (DM): ICD-10-CM

## 2019-02-11 DIAGNOSIS — E11.3292 CONTROLLED TYPE 2 DIABETES MELLITUS WITH LEFT EYE AFFECTED BY MILD NONPROLIFERATIVE RETINOPATHY WITHOUT MACULAR EDEMA, WITHOUT LONG-TERM CURRENT USE OF INSULIN (HCC): Primary | ICD-10-CM

## 2019-02-11 DIAGNOSIS — E55.9 VITAMIN D DEFICIENCY: ICD-10-CM

## 2019-02-11 DIAGNOSIS — E78.2 MIXED DYSLIPIDEMIA: ICD-10-CM

## 2019-02-11 DIAGNOSIS — R51.9 ACUTE NONINTRACTABLE HEADACHE, UNSPECIFIED HEADACHE TYPE: ICD-10-CM

## 2019-02-11 DIAGNOSIS — R53.83 MALAISE AND FATIGUE: ICD-10-CM

## 2019-02-11 DIAGNOSIS — R11.0 NAUSEA: ICD-10-CM

## 2019-02-11 PROBLEM — H40.053 BILATERAL OCULAR HYPERTENSION: Status: ACTIVE | Noted: 2018-04-23

## 2019-02-11 PROBLEM — H52.4 PRESBYOPIA OF BOTH EYES: Status: ACTIVE | Noted: 2018-04-23

## 2019-02-11 NOTE — PROGRESS NOTES
Torito Golden  Identified pt with two pt identifiers(name and ). Chief Complaint Patient presents with  Nausea Rm 4  Bloated 1. Have you been to the ER, urgent care clinic since your last visit?n   Hospitalized since your last visit? n  
 
2. Have you seen or consulted any other health care providers outside of the [address] [place] [address] since your last visit? Include any pap smears or colon screening. n 
 
 
Advance Care Planning In the event something were to happen to you and you were unable to speak on your behalf, do you have an Advance Directive/ Living Will in place stating your wishes? NO If yes, do we have a copy on file NO If no, would you like information NO Medication reconciliation up to date and corrected with patient at this time. Today's provider has been notified of reason for visit, vitals and flowsheets obtained on patients. Reviewed record in preparation for visit, huddled with provider and have obtained necessary documentation. Diabetic foot exam:  
 
Left Foot: 
 Visual Exam: normal  
 Pulse DP: 2+ (normal) Filament test: normal sensation Vibratory sensation: normal 
   
Right Foot: 
 Visual Exam: normal  
 Pulse DP: 2+ (normal) Filament test: normal sensation Vibratory sensation: normal 
 
 
 
Health Maintenance Due Topic  Shingrix Vaccine Age 50> (1 of 2)  FOBT Q 1 YEAR AGE 50-75  Influenza Age 5 to Adult  PAP AKA CERVICAL CYTOLOGY  FOOT EXAM Q1   
 HEMOGLOBIN A1C Q6M Wt Readings from Last 3 Encounters:  
19 178 lb 4.8 oz (80.9 kg) 18 175 lb (79.4 kg) 17 168 lb 3.2 oz (76.3 kg) Temp Readings from Last 3 Encounters:  
19 97.3 °F (36.3 °C) (Oral) 18 97.5 °F (36.4 °C) (Oral) 17 97.5 °F (36.4 °C) (Oral) BP Readings from Last 3 Encounters:  
19 120/78  
18 132/84  
17 116/79 Pulse Readings from Last 3 Encounters:  
19 83 06/29/18 64  
12/29/17 68 Vitals:  
 02/11/19 2848 BP: 120/78 Pulse: 83 Resp: 20 Temp: 97.3 °F (36.3 °C) TempSrc: Oral  
SpO2: 99% Weight: 178 lb 4.8 oz (80.9 kg) Height: 5' 2.01\" (1.575 m) PainSc:   0 - No pain Learning Assessment: 
:  
 
Learning Assessment 8/1/2016 PRIMARY LEARNER Patient HIGHEST LEVEL OF EDUCATION - PRIMARY LEARNER  4 YEARS OF COLLEGE  
BARRIERS PRIMARY LEARNER NONE  
CO-LEARNER CAREGIVER No  
PRIMARY LANGUAGE ENGLISH  
LEARNER PREFERENCE PRIMARY READING  
  VIDEOS  
ANSWERED BY Patient RELATIONSHIP SELF Depression Screening: 
:  
 
PHQ over the last two weeks 2/11/2019 PHQ Not Done - Little interest or pleasure in doing things Not at all Feeling down, depressed, irritable, or hopeless Not at all Total Score PHQ 2 0 No flowsheet data found. Fall Risk Assessment: 
:  
 
Fall Risk Assessment, last 12 mths 6/29/2018 Able to walk? Yes Fall in past 12 months? No  
 
 
Abuse Screening: 
:  
 
Abuse Screening Questionnaire 8/1/2016 Do you ever feel afraid of your partner? Areta Lusty Are you in a relationship with someone who physically or mentally threatens you? Areta Lusty Is it safe for you to go home? Y  
 
 
ADL Screening: 
:  
 
ADL Assessment 2/11/2019 Feeding yourself No Help Needed Getting from bed to chair No Help Needed Getting dressed No Help Needed Bathing or showering No Help Needed Walk across the room (includes cane/walker) No Help Needed Using the telphone No Help Needed Taking your medications No Help Needed Preparing meals No Help Needed Managing money (expenses/bills) No Help Needed Moderately strenuous housework (laundry) No Help Needed Shopping for personal items (toiletries/medicines) No Help Needed Shopping for groceries No Help Needed Driving No Help Needed Climbing a flight of stairs No Help Needed Getting to places beyond walking distances No Help Needed BMI: 
 Weight Metrics 2/11/2019 6/29/2018 12/29/2017 6/29/2017 3/23/2017 12/28/2016 8/1/2016 Weight 178 lb 4.8 oz 175 lb 168 lb 3.2 oz 162 lb 3.2 oz 158 lb 165 lb 8 oz 177 lb 3.2 oz  
BMI 32.6 kg/m2 32 kg/m2 30.75 kg/m2 29.66 kg/m2 28.9 kg/m2 29.79 kg/m2 31.87 kg/m2 Medication reconciliation up to date and corrected with patient at this time.

## 2019-02-11 NOTE — PROGRESS NOTES
Chief Complaint Patient presents with  Nausea Rm 4  Bloated HPI: 
The patient is a 46 y.o. female who presents today for a follow up appointment. No hospital, ER or specialist visits since last primary care visit except as noted below. Monday, 2/4/2019, started feeling nausea, HA, general malaise, and diarrhea. She states her symptoms have improved x 2 days. She also was off Invokamet and had just restarted this prior to her symptoms. Her symptoms are unrelated to eating, portion size, bland. Yesterday she felt pretty good all day and so far today has as well. She was off her medication for approximately 2 weeks. She has been checking her home blood glucose readings and it has been running on average in the 90s. She has also been checking her blood pressure at home and it has been running in the 120s/80s. Review of Systems A comprehensive review of systems was negative except for that written in the HPI. Patient Active Problem List  
Diagnosis Code  Environmental allergies Z91.09  
 Chronic low back pain M54.5, G89.29  
 Glaucoma suspect of both eyes H40.003  Dry eyes H04.123  
 Diabetes mellitus type 2, controlled, without complications (HCC) J29.1  Family history of glaucoma in father Z80.65  Family history of colon cancer Z80.0  Family history of diabetes mellitus (DM) Z83.3  Family history of breast cancer Z80.3  Family history of heart disease Z82.49  Vitamin D deficiency E55.9  Colon cancer screening Z12.11  
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  
Diagnosis Date  Breast cancer ([address]  Left. Txd with chemo,radiation. Dr. Masha Valenzuela  Bursitis of elbow  Left.  Chickenpox childhood  Diabetes mellitus type 2, controlled, without complications ([address] [facility]  Dr. Anum Bey. Dr. Tano Laughlin, ophth.  Dry eyes  Karl Larsen, OD. Dr. Pelon Carrion  Elevated cholesterol  Environmental allergies  Gestational diabetes   Glaucoma suspect of both eyes  Karl Larsen, OD. Dr. Pelon Carrion.  Heartburn 08  Mononucleosis 2006  Vitamin D deficiency  Past Surgical History:  
Procedure Laterality Date  HX BREAST BIOPSY Left 1998  
 due to cancer. Dr. Farhad Mabry  HX BREAST LUMPECTOMY Left 1999  
 with 18 lymph nodes removed. Dr. Masha Valenzuela  MULTIPLE DELIVERY   ,  Dr. Levon Larry Social History Tobacco Use  Smoking status: Never Smoker  Smokeless tobacco: Never Used Substance Use Topics  Alcohol use: Yes Alcohol/week: 0.0 oz  
  Comment: RARE  Drug use: No  
 
 
Family History Problem Relation Age of Onset  Hypertension Mother  Diabetes Mother  High Cholesterol Mother  Heart Disease Father  Heart Surgery Father CABG  
 Diabetes Father  Glaucoma Father  Colon Cancer Father 68  Breast Cancer Maternal Aunt x2  Cancer Paternal Uncle   
     ?laryngeal  
 Diabetes Paternal Grandmother  Glaucoma Paternal Aunt Outpatient Medications Marked as Taking for the 19 encounter (Office Visit) with Audrey Hinson NP Medication Sig Dispense Refill  canagliflozin-metFORMIN (INVOKAMET)  mg tab Take 1 Tab by mouth daily. 30 Tab 2  
 enalapril (VASOTEC) 2.5 mg tablet TAKE 1 TABLET BY MOUTH DAILY 90 Tab 0  
 rosuvastatin (CRESTOR) 20 mg tablet TAKE 1 TABLET BY MOUTH NIGHTLY 90 Tab 1  cholecalciferol (VITAMIN D3) 1,000 unit cap Take  by mouth daily.  ibuprofen (MOTRIN) 600 mg tablet Take 1 Tab by mouth every six (6) hours as needed for Pain. 20 Tab 0 Current Outpatient Medications on File Prior to Visit Medication Sig Dispense Refill  enalapril (VASOTEC) 2.5 mg tablet TAKE 1 TABLET BY MOUTH DAILY 90 Tab 0  
 rosuvastatin (CRESTOR) 20 mg tablet TAKE 1 TABLET BY MOUTH NIGHTLY 90 Tab 1  cholecalciferol (VITAMIN D3) 1,000 unit cap Take  by mouth daily.  ibuprofen (MOTRIN) 600 mg tablet Take 1 Tab by mouth every six (6) hours as needed for Pain. 20 Tab 0 No current facility-administered medications on file prior to visit. No Known Allergies PE: 
Visit Vitals /78 (BP 1 Location: Left arm, BP Patient Position: Sitting) Pulse 83 Temp 97.3 °F (36.3 °C) (Oral) Resp 20 Ht 5' 2.01\" (1.575 m) Wt 178 lb 4.8 oz (80.9 kg) SpO2 99% BMI 32.60 kg/m² Results for orders placed or performed in visit on 06/12/18 LIPID PANEL Result Value Ref Range Cholesterol, total 264 (H) 100 - 199 mg/dL Triglyceride 184 (H) 0 - 149 mg/dL HDL Cholesterol 40 >39 mg/dL VLDL, calculated 37 5 - 40 mg/dL LDL, calculated 187 (H) 0 - 99 mg/dL METABOLIC PANEL, COMPREHENSIVE Result Value Ref Range Glucose 107 (H) 65 - 99 mg/dL BUN 17 6 - 24 mg/dL Creatinine 0.66 0.57 - 1.00 mg/dL GFR est non- >59 mL/min/1.73 GFR est  >59 mL/min/1.73  
 BUN/Creatinine ratio 26 (H) 9 - 23 Sodium 138 134 - 144 mmol/L Potassium 4.3 3.5 - 5.2 mmol/L Chloride 97 96 - 106 mmol/L  
 CO2 23 20 - 29 mmol/L Calcium 9.5 8.7 - 10.2 mg/dL Protein, total 7.1 6.0 - 8.5 g/dL Albumin 4.6 3.5 - 5.5 g/dL GLOBULIN, TOTAL 2.5 1.5 - 4.5 g/dL A-G Ratio 1.8 1.2 - 2.2 Bilirubin, total 0.3 0.0 - 1.2 mg/dL Alk. phosphatase 83 39 - 117 IU/L  
 AST (SGOT) 19 0 - 40 IU/L  
 ALT (SGPT) 22 0 - 32 IU/L  
TSH 3RD GENERATION Result Value Ref Range TSH 4.090 0.450 - 4.500 uIU/mL VITAMIN D, 25 HYDROXY Result Value Ref Range VITAMIN D, 25-HYDROXY 39.5 30.0 - 100.0 ng/mL MICROALBUMIN, UR, RAND W/ MICROALB/CREAT RATIO Result Value Ref Range Creatinine, urine 37.5 Not Estab. mg/dL Microalbumin, urine <3.0 Not Estab. ug/mL Microalb/Creat ratio (ug/mg creat.) <8.0 0.0 - 30.0 mg/g creat URINALYSIS W/ RFLX MICROSCOPIC Result Value Ref Range Specific Gravity 1.020 1.005 - 1.030  
 pH (UA) 5.5 5.0 - 7.5 Color Yellow Yellow Appearance Clear Clear Leukocyte Esterase Negative Negative Protein Negative Negative/Trace Glucose 3+ (A) Negative Ketone Negative Negative Blood Negative Negative Bilirubin Negative Negative Urobilinogen 0.2 0.2 - 1.0 mg/dL Nitrites Negative Negative Microscopic Examination Comment HEMOGLOBIN A1C WITH EAG Result Value Ref Range Hemoglobin A1c 6.1 (H) 4.8 - 5.6 % Estimated average glucose 128 mg/dL CVD REPORT Result Value Ref Range INTERPRETATION Note DIABETES PATIENT EDUCATION Result Value Ref Range PDF Image Not applicable Gen: alert, oriented, no acute distress Head: normocephalic, atraumatic Ears: external auditory canals clear, TMs without erythema or effusion Eyes: pupils equal round reactive to light, sclera clear, conjunctiva clear Oral: moist mucus membranes, no oral lesions, no pharyngeal inflammation or exudate Neck: symmetric normal sized thyroid, no carotid bruits, no jugular vein distention Resp: no increase work of breathing, lungs clear to ausculation bilaterally, no wheezing, rales or rhonchi CV: S1, S2 normal.  No murmurs, rubs, or gallops. Abd: soft, not tender, not distended. No hepatosplenomegaly. Normal bowel sounds. No hernias. No abdominal or renal bruits. Neuro: cranial nerves intact, normal strength and movement in all extremities, reflexes and sensation intact and symmetric. Skin: no lesion or rash Extremities: no cyanosis or edema Assessment/Plan: ICD-10-CM ICD-9-CM 1. 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  
  362.04 URINALYSIS W/ RFLX MICROSCOPIC METABOLIC PANEL, COMPREHENSIVE  
   HEMOGLOBIN A1C WITH EAG 2. Vitamin D deficiency E55.9 268.9 VITAMIN D, 25 HYDROXY 3. Family history of diabetes mellitus (DM) Z83.3 V18.0 canagliflozin-metFORMIN (INVOKAMET)  mg tab URINALYSIS W/ RFLX MICROSCOPIC METABOLIC PANEL, COMPREHENSIVE  
   HEMOGLOBIN A1C WITH EAG  
4. Mixed dyslipidemia E78.2 272.2 LIPID PANEL 5. Nausea R11.0 787.02 URINALYSIS W/ RFLX MICROSCOPIC METABOLIC PANEL, COMPREHENSIVE  
   HEMOGLOBIN A1C WITH EAG 6. Diarrhea, unspecified type R19.7 787.91 URINALYSIS W/ RFLX MICROSCOPIC METABOLIC PANEL, COMPREHENSIVE  
   HEMOGLOBIN A1C WITH EAG  
   CBC WITH AUTOMATED DIFF 7. Acute nonintractable headache, unspecified headache type R51 784.0 URINALYSIS W/ RFLX MICROSCOPIC METABOLIC PANEL, COMPREHENSIVE  
   HEMOGLOBIN A1C WITH EAG  
8. Malaise and fatigue R53.81 780.79 URINALYSIS W/ RFLX MICROSCOPIC  
 R53.83  LIPID PANEL  
   METABOLIC PANEL, COMPREHENSIVE  
   TSH 3RD GENERATION  
   VITAMIN D, 25 HYDROXY  
   HEMOGLOBIN A1C WITH EAG  
   CBC WITH AUTOMATED DIFF Follow-up Disposition: 
Return in about 4 weeks (around 3/11/2019) for Medication Check, Diabetes, XOL, Hypertension, Lab F/U w/ Dr. Hien Quick. lab results and schedule of future lab studies reviewed with patient 
reviewed diet, exercise and weight control 
reviewed medications and side effects in detail If symptoms return, consider abdominal ultrasound to r/o cholelithiasis. Patient to call the office if her symptoms return. Health Maintenance reviewed - deferred to PCP. Recommended healthy diet low in carbohydrates, fats, sodium and cholesterol. Recommended regular cardiovascular exercise 3-6 times per week for 30-60 minutes daily. Chart is reviewed and updated today in the office. Records requested for other providers patient has seen and is currently seeing. Patient was offered a choice/choices in the treatment plan today. Patient expresses understanding of the plan and agrees with recommendations. Verbal and written instructions (see AVS) provided. See patient instructions for more. Patient expresses understanding of diagnosis and treatment plan.

## 2019-02-11 NOTE — PATIENT INSTRUCTIONS
Type 2 Diabetes: Care Instructions Your Care Instructions Type 2 diabetes is a disease that develops when the body's tissues cannot use insulin properly. Over time, the pancreas cannot make enough insulin. Insulin is a hormone that helps the body's cells use sugar (glucose) for energy. It also helps the body store extra sugar in muscle, fat, and liver cells. Without insulin, the sugar cannot get into the cells to do its work. It stays in the blood instead. This can cause high blood sugar levels. A person has diabetes when the blood sugar stays too high too much of the time. Over time, diabetes can lead to diseases of the heart, blood vessels, nerves, kidneys, and eyes. You may be able to control your blood sugar by losing weight, eating a healthy diet, and getting daily exercise. You may also have to take insulin or other diabetes medicine. Follow-up care is a key part of your treatment and safety. Be sure to make and go to all appointments. Call your doctor if you are having problems. It's also a good idea to know your test results and keep a list of the medicines you take. How can you care for yourself at home? · Keep your blood sugar at a target level (which you set with your doctor). ? Eat a good diet that spreads carbohydrate throughout the day. Carbohydratethe body's main source of fuelaffects blood sugar more than any other nutrient. Carbohydrate is in fruits, vegetables, milk, and yogurt. It also is in breads, cereals, vegetables such as potatoes and corn, and sugary foods such as candy and cakes. ? Aim for 30 minutes of exercise on most, preferably all, days of the week. Walking is a good choice. You also may want to do other activities, such as running, swimming, cycling, or playing tennis or team sports. If your doctor says it's okay, do muscle-strengthening exercises at least 2 times a week. ? Take your medicines exactly as prescribed.  Call your doctor if you think you are having a problem with your medicine. You will get more details on the specific medicines your doctor prescribes. · Check your blood sugar as often as your doctor recommends. It is important to keep track of any symptoms you have, such as low blood sugar. Also tell your doctor if you have any changes in your activities, diet, or insulin use. · Talk to your doctor before you start taking aspirin every day. Aspirin can help certain people lower their risk of a heart attack or stroke. But taking aspirin isn't right for everyone, because it can cause serious bleeding. · Do not smoke. If you need help quitting, talk to your doctor about stop-smoking programs and medicines. These can increase your chances of quitting for good. · Keep your cholesterol and blood pressure at normal levels. You may need to take one or more medicines to reach your goals. Take them exactly as directed. Do not stop or change a medicine without talking to your doctor first. 
When should you call for help? Call 911 anytime you think you may need emergency care. For example, call if: 
  · You passed out (lost consciousness), or you suddenly become very sleepy or confused. (You may have very low blood sugar.)  
 Call your doctor now or seek immediate medical care if: 
  · Your blood sugar is 300 mg/dL or is higher than the level your doctor has set for you.  
  · You have symptoms of low blood sugar, such as: ? Sweating. ? Feeling nervous, shaky, and weak. ? Extreme hunger and slight nausea. ? Dizziness and headache. 
? Blurred vision. ? Confusion.  
 Watch closely for changes in your health, and be sure to contact your doctor if: 
  · You often have problems controlling your blood sugar.  
  · You have symptoms of long-term diabetes problems, such as: ? New vision changes. ? New pain, numbness, or tingling in your hands or feet. ? Skin problems. Where can you learn more? Go to http://thania-chong.info/. Enter C553 in the search box to learn more about \"Type 2 Diabetes: Care Instructions. \" Current as of: July 25, 2018 Content Version: 11.9 © 2166-0899 [employer]. Care instructions adapted under license by [employer] (which disclaims liability or warranty for this information). If you have questions about a medical condition or this instruction, always ask your healthcare professional. [address] any warranty or liability for your use of this information. Headache: Care Instructions Your Care Instructions Headaches have many possible causes. Most headaches aren't a sign of a more serious problem, and they will get better on their own. Home treatment may help you feel better faster. The doctor has checked you carefully, but problems can develop later. If you notice any problems or new symptoms, get medical treatment right away. Follow-up care is a key part of your treatment and safety. Be sure to make and go to all appointments, and call your doctor if you are having problems. It's also a good idea to know your test results and keep a list of the medicines you take. How can you care for yourself at home? · Do not drive if you have taken a prescription pain medicine. · Rest in a quiet, dark room until your headache is gone. Close your eyes and try to relax or go to sleep. Don't watch TV or read. · Put a cold, moist cloth or cold pack on the painful area for 10 to 20 minutes at a time. Put a thin cloth between the cold pack and your skin. · Use a warm, moist towel or a heating pad set on low to relax tight shoulder and neck muscles. · Have someone gently massage your neck and shoulders. · Take pain medicines exactly as directed. ? If the doctor gave you a prescription medicine for pain, take it as prescribed. ? If you are not taking a prescription pain medicine, ask your doctor if you can take an over-the-counter medicine. · Be careful not to take pain medicine more often than the instructions allow, because you may get worse or more frequent headaches when the medicine wears off. · Do not ignore new symptoms that occur with a headache, such as a fever, weakness or numbness, vision changes, or confusion. These may be signs of a more serious problem. To prevent headaches · Keep a headache diary so you can figure out what triggers your headaches. Avoiding triggers may help you prevent headaches. Record when each headache began, how long it lasted, and what the pain was like (throbbing, aching, stabbing, or dull). Write down any other symptoms you had with the headache, such as nausea, flashing lights or dark spots, or sensitivity to bright light or loud noise. Note if the headache occurred near your period. List anything that might have triggered the headache, such as certain foods (chocolate, cheese, wine) or odors, smoke, bright light, stress, or lack of sleep. · Find healthy ways to deal with stress. Headaches are most common during or right after stressful times. Take time to relax before and after you do something that has caused a headache in the past. 
· Try to keep your muscles relaxed by keeping good posture. Check your jaw, face, neck, and shoulder muscles for tension, and try relaxing them. When sitting at a desk, change positions often, and stretch for 30 seconds each hour. · Get plenty of sleep and exercise. · Eat regularly and well. Long periods without food can trigger a headache. · Treat yourself to a massage. Some people find that regular massages are very helpful in relieving tension. · Limit caffeine by not drinking too much coffee, tea, or soda. But don't quit caffeine suddenly, because that can also give you headaches. · Reduce eyestrain from computers by blinking frequently and looking away from the computer screen every so often.  Make sure you have proper eyewear and that your monitor is set up properly, about an arm's length away. · Seek help if you have depression or anxiety. Your headaches may be linked to these conditions. Treatment can both prevent headaches and help with symptoms of anxiety or depression. When should you call for help? Call 911 anytime you think you may need emergency care. For example, call if: 
  · You have signs of a stroke. These may include: 
? Sudden numbness, paralysis, or weakness in your face, arm, or leg, especially on only one side of your body. ? Sudden vision changes. ? Sudden trouble speaking. ? Sudden confusion or trouble understanding simple statements. ? Sudden problems with walking or balance. ? A sudden, severe headache that is different from past headaches.  
 Call your doctor now or seek immediate medical care if: 
  · You have a new or worse headache.  
  · Your headache gets much worse. Where can you learn more? Go to http://thania-chong.info/. Enter M271 in the search box to learn more about \"Headache: Care Instructions. \" Current as of: Vicki 3, 2018 Content Version: 11.9 © 1967-4793 [employer]. Care instructions adapted under license by [employer] (which disclaims liability or warranty for this information). If you have questions about a medical condition or this instruction, always ask your healthcare professional. [place] [address] any warranty or liability for your use of this information.

## 2019-02-12 LAB
25(OH)D3+25(OH)D2 SERPL-MCNC: 35.5 NG/ML (ref 30–100)
ALBUMIN SERPL-MCNC: 4.6 G/DL (ref 3.5–5.5)
ALBUMIN/GLOB SERPL: 1.6 {RATIO} (ref 1.2–2.2)
ALP SERPL-CCNC: 81 IU/L (ref 39–117)
ALT SERPL-CCNC: 19 IU/L (ref 0–32)
APPEARANCE UR: CLEAR
AST SERPL-CCNC: 20 IU/L (ref 0–40)
BASOPHILS # BLD AUTO: 0.1 X10E3/UL (ref 0–0.2)
BASOPHILS NFR BLD AUTO: 1 %
BILIRUB SERPL-MCNC: 0.3 MG/DL (ref 0–1.2)
BILIRUB UR QL STRIP: NEGATIVE
BUN SERPL-MCNC: 15 MG/DL (ref 6–24)
BUN/CREAT SERPL: 25 (ref 9–23)
CALCIUM SERPL-MCNC: 9.6 MG/DL (ref 8.7–10.2)
CHLORIDE SERPL-SCNC: 101 MMOL/L (ref 96–106)
CHOLEST SERPL-MCNC: 217 MG/DL (ref 100–199)
CO2 SERPL-SCNC: 23 MMOL/L (ref 20–29)
COLOR UR: YELLOW
CREAT SERPL-MCNC: 0.61 MG/DL (ref 0.57–1)
EOSINOPHIL # BLD AUTO: 0.2 X10E3/UL (ref 0–0.4)
EOSINOPHIL NFR BLD AUTO: 4 %
ERYTHROCYTE [DISTWIDTH] IN BLOOD BY AUTOMATED COUNT: 14.5 % (ref 12.3–15.4)
EST. AVERAGE GLUCOSE BLD GHB EST-MCNC: 140 MG/DL
GLOBULIN SER CALC-MCNC: 2.8 G/DL (ref 1.5–4.5)
GLUCOSE SERPL-MCNC: 117 MG/DL (ref 65–99)
GLUCOSE UR QL: ABNORMAL
HBA1C MFR BLD: 6.5 % (ref 4.8–5.6)
HCT VFR BLD AUTO: 42.3 % (ref 34–46.6)
HDLC SERPL-MCNC: 41 MG/DL
HGB BLD-MCNC: 13.8 G/DL (ref 11.1–15.9)
HGB UR QL STRIP: NEGATIVE
IMM GRANULOCYTES # BLD AUTO: 0 X10E3/UL (ref 0–0.1)
IMM GRANULOCYTES NFR BLD AUTO: 0 %
INTERPRETATION, 910389: NORMAL
KETONES UR QL STRIP: NEGATIVE
LDLC SERPL CALC-MCNC: 151 MG/DL (ref 0–99)
LEUKOCYTE ESTERASE UR QL STRIP: NEGATIVE
LYMPHOCYTES # BLD AUTO: 1.9 X10E3/UL (ref 0.7–3.1)
LYMPHOCYTES NFR BLD AUTO: 33 %
Lab: NORMAL
MCH RBC QN AUTO: 29.4 PG (ref 26.6–33)
MCHC RBC AUTO-ENTMCNC: 32.6 G/DL (ref 31.5–35.7)
MCV RBC AUTO: 90 FL (ref 79–97)
MICRO URNS: ABNORMAL
MONOCYTES # BLD AUTO: 0.3 X10E3/UL (ref 0.1–0.9)
MONOCYTES NFR BLD AUTO: 5 %
NEUTROPHILS # BLD AUTO: 3.3 X10E3/UL (ref 1.4–7)
NEUTROPHILS NFR BLD AUTO: 57 %
NITRITE UR QL STRIP: NEGATIVE
PH UR STRIP: 5.5 [PH] (ref 5–7.5)
PLATELET # BLD AUTO: 315 X10E3/UL (ref 150–379)
POTASSIUM SERPL-SCNC: 4.5 MMOL/L (ref 3.5–5.2)
PROT SERPL-MCNC: 7.4 G/DL (ref 6–8.5)
PROT UR QL STRIP: NEGATIVE
RBC # BLD AUTO: 4.69 X10E6/UL (ref 3.77–5.28)
SODIUM SERPL-SCNC: 139 MMOL/L (ref 134–144)
SP GR UR: >=1.03 (ref 1–1.03)
TRIGL SERPL-MCNC: 123 MG/DL (ref 0–149)
TSH SERPL DL<=0.005 MIU/L-ACNC: 3.5 UIU/ML (ref 0.45–4.5)
UROBILINOGEN UR STRIP-MCNC: 0.2 MG/DL (ref 0.2–1)
VLDLC SERPL CALC-MCNC: 25 MG/DL (ref 5–40)
WBC # BLD AUTO: 5.8 X10E3/UL (ref 3.4–10.8)

## 2019-02-18 RX ORDER — ROSUVASTATIN CALCIUM 40 MG/1
40 TABLET, COATED ORAL
Qty: 30 TAB | Refills: 1 | Status: SHIPPED | OUTPATIENT
Start: 2019-02-18 | End: 2019-09-03 | Stop reason: SDUPTHER

## 2019-02-18 NOTE — PROGRESS NOTES
Your lab results have been reviewed and are as follows: 
 
Urine Test: 
Your urine analysis is normal. 
 
Cholesterol Panel: Total Cholesterol is 217 (goal <200). Triglycerides are 123.  (goal <150) Your HDL or \"good\" cholesterol is 41. (goal >40). Your LDL or \"bad\" cholesterol is 151. (goal <100). Your cholesterol remains high. I have increased your dose of Crestor to 40mg and sent your medication to the pharmacy on file. We will need to recheck your cholesterol levels in 2-3 months. CMP: 
Fasting blood sugar is abnormal at 117, your hemoglobin A1C is 6.5. You do have well controlled diabetes. Great job! Kidney function is normal.  
Your electrolytes are normal.  
Your liver function is normal.  
 
Thyroid: 
Your thyroid function is normal. 
 
Vitamin D is normal. Try to get 10-15 minutes of sunlight daily without burning and try to eat foods such as low fat dairy which is Vitamin D fortified. CBC: 
Your red blood cell count is normal.  
Your white blood cell count is normal.  
Your platelet count is normal.  
You are not anemic and your hemoglobin is 13.8. Ita Lopez, MSN, MHA, FNP-BC

## 2019-03-02 ENCOUNTER — OFFICE VISIT (OUTPATIENT)
Dept: URGENT CARE | Age: 54
End: 2019-03-02

## 2019-03-02 VITALS
SYSTOLIC BLOOD PRESSURE: 134 MMHG | DIASTOLIC BLOOD PRESSURE: 74 MMHG | BODY MASS INDEX: 32.94 KG/M2 | HEIGHT: 62 IN | HEART RATE: 76 BPM | WEIGHT: 179 LBS | RESPIRATION RATE: 16 BRPM | TEMPERATURE: 98.6 F | OXYGEN SATURATION: 96 %

## 2019-03-02 DIAGNOSIS — H10.31 ACUTE CONJUNCTIVITIS OF RIGHT EYE, UNSPECIFIED ACUTE CONJUNCTIVITIS TYPE: Primary | ICD-10-CM

## 2019-03-02 RX ORDER — GENTAMICIN SULFATE 3 MG/ML
2 SOLUTION/ DROPS OPHTHALMIC EVERY 4 HOURS
Qty: 5 ML | Refills: 0 | Status: SHIPPED | OUTPATIENT
Start: 2019-03-02 | End: 2019-03-09

## 2019-03-03 NOTE — PROGRESS NOTES
Redness and drainage from the rt eye since this AM. Feels irritated. No pain No vision change      The history is provided by the patient. Red Eye   This is a new problem. The problem occurs constantly. The problem has not changed since onset. Nothing aggravates the symptoms. Nothing relieves the symptoms. Past Medical History:   Diagnosis Date    Breast cancer ([facility] [address]     Left. Txd with chemo,radiation. Dr. Demetrius Witt of elbow     Left.  Chickenpox childhood    [facility] type 2, controlled, without complications [facility]     Dr. Daysi Sebastian. Dr. Simon Arroyo, ophth.  Dry eyes     Blair Garcias, OD. Dr. Destiny Arcos Elevated cholesterol     Environmental allergies     Gestational diabetes     Glaucoma suspect of both eyes     [facility], [address] Dr. Payton Meadows.  Heartburn 08    Mononucleosis 2006    Vitamin D deficiency         Past Surgical History:   Procedure Laterality Date    HX BREAST BIOPSY Left 1998    due to cancer. Dr. Montes De Oca [place] HX BREAST LUMPECTOMY Left 1999    with 18 lymph nodes removed.  Dr. [place]   ,     Dr. Tyrell Gillis         Family History   Problem Relation Age of Onset    Hypertension Mother     Diabetes Mother     High Cholesterol Mother     Heart Disease Father     Heart Surgery Father         CABG    Diabetes Father     Glaucoma Father     Colon Cancer Father 68    Breast Cancer Maternal Aunt         x2    Cancer Paternal Uncle         ?laryngeal    Diabetes Paternal Grandmother     Glaucoma Paternal Aunt         Social History     Socioeconomic History    Marital status:      Spouse name: Not on file    Number of children: Not on file    Years of education: Not on file    Highest education level: Not on file   Social Needs    Financial resource strain: Not on file    Food insecurity - worry: Not on file    Food insecurity - inability: Not on file    Transportation needs - medical: Not on file   CoNarrative needs - non-medical: Not on file   Occupational History    Not on file   Tobacco Use    Smoking status: Never Smoker    Smokeless tobacco: Never Used   Substance and Sexual Activity    Alcohol use: Yes     Alcohol/week: 0.0 oz     Comment: RARE    Drug use: No    Sexual activity: Yes     Partners: Male     Birth control/protection: Surgical     Comment:  had a vasectomy   Other Topics Concern    Not on file   Social History Narrative    Not on file                ALLERGIES: Patient has no known allergies. Review of Systems   Constitutional: Negative. HENT: Negative. Eyes: Positive for discharge and redness. Negative for photophobia, pain and visual disturbance. Skin: Negative. Neurological: Negative. Vitals:    03/02/19 1854   BP: 134/74   Pulse: 76   Resp: 16   Temp: 98.6 °F (37 °C)   SpO2: 96%   Weight: 179 lb (81.2 kg)   Height: 5' 2\" (1.575 m)       Physical Exam   Constitutional: She is oriented to person, place, and time. She appears well-developed and well-nourished. No distress. HENT:   Head: Normocephalic and atraumatic. Mouth/Throat: Oropharynx is clear and moist.   Eyes: EOM and lids are normal. Pupils are equal, round, and reactive to light. Right conjunctiva is injected. Slit lamp exam:       The right eye shows fluorescein uptake (irritation seen with fluorescein exam. No focal uptake, abrasion, ulcer or dendrtitc pattern). The right eye shows no corneal abrasion, no corneal flare, no corneal ulcer and no foreign body. Rt lids without erythema or swelling or tenderness, No pain with EOM   Neck: Normal range of motion. Neck supple. Cardiovascular: Normal rate, regular rhythm and normal heart sounds. Pulmonary/Chest: Effort normal and breath sounds normal. No respiratory distress. Lymphadenopathy:     She has no cervical adenopathy.    Neurological: She is alert and oriented to person, place, and time. Skin: Skin is warm and dry. No rash noted. No erythema. Psychiatric: She has a normal mood and affect. Her behavior is normal. Thought content normal.   Nursing note and vitals reviewed. [facility]    ICD-10-CM ICD-9-CM    1. Acute conjunctivitis of right eye, unspecified acute conjunctivitis type H10.31 372.00      Medications Ordered Today   Medications    gentamicin (GARAMYCIN) 0.3 % ophthalmic solution     Sig: Administer 2 Drops to right eye every four (4) hours for 7 days. Dispense:  5 mL     Refill:  0     The patients condition was discussed with the patient and they understand. The patient is to follow up with primary care doctor ,If signs and symptoms become worse the pt is to go to the ER. The patient is to take medications as prescribed.                Procedures

## 2019-03-03 NOTE — PATIENT INSTRUCTIONS
Rest and seek medical care for increased problems, any questions or concern including but not limited to the ones discussed with you here today. Practice good hand washing and change your pillow case     Pinkeye: Care Instructions  Your Care Instructions    Pinkeye is redness and swelling of the eye surface and the conjunctiva (the lining of the eyelid and the covering of the white part of the eye). Pinkeye is also called conjunctivitis. Pinkeye is often caused by infection with bacteria or a virus. Dry air, allergies, smoke, and chemicals are other common causes. Pinkeye often clears on its own in 7 to 10 days. Antibiotics only help if the pinkeye is caused by bacteria. Pinkeye caused by infection spreads easily. If an allergy or chemical is causing pinkeye, it will not go away unless you can avoid whatever is causing it. Follow-up care is a key part of your treatment and safety. Be sure to make and go to all appointments, and call your doctor if you are having problems. It's also a good idea to know your test results and keep a list of the medicines you take. How can you care for yourself at home? · Wash your hands often. Always wash them before and after you treat pinkeye or touch your eyes or face. · Use moist cotton or a clean, wet cloth to remove crust. Wipe from the inside corner of the eye to the outside. Use a clean part of the cloth for each wipe. · Put cold or warm wet cloths on your eye a few times a day if the eye hurts. · Do not wear contact lenses or eye makeup until the pinkeye is gone. Throw away any eye makeup you were using when you got pinkeye. Clean your contacts and storage case. If you wear disposable contacts, use a new pair when your eye has cleared and it is safe to wear contacts again. · If the doctor gave you antibiotic ointment or eyedrops, use them as directed. Use the medicine for as long as instructed, even if your eye starts looking better soon.  Keep the bottle tip clean, and do not let it touch the eye area. · To put in eyedrops or ointment:  ? Tilt your head back, and pull your lower eyelid down with one finger. ? Drop or squirt the medicine inside the lower lid. ? Close your eye for 30 to 60 seconds to let the drops or ointment move around. ? Do not touch the ointment or dropper tip to your eyelashes or any other surface. · Do not share towels, pillows, or washcloths while you have pinkeye. When should you call for help? Call your doctor now or seek immediate medical care if:    · You have pain in your eye, not just irritation on the surface.     · You have a change in vision or loss of vision.     · You have an increase in discharge from the eye.     · Your eye has not started to improve or begins to get worse within 48 hours after you start using antibiotics.     · Pinkeye lasts longer than 7 days.    Watch closely for changes in your health, and be sure to contact your doctor if you have any problems. Where can you learn more? Go to http://thania-chong.info/. Enter Y392 in the search box to learn more about \"Pinkeye: Care Instructions. \"  Current as of: September 23, 2018  Content Version: 11.9  © 8986-0365 [employer]. Care instructions adapted under license by [employer] (which disclaims liability or warranty for this information). If you have questions about a medical condition or this instruction, always ask your healthcare professional. [place] [address] any warranty or liability for your use of this information.

## 2019-06-05 DIAGNOSIS — Z83.3 FAMILY HISTORY OF DIABETES MELLITUS (DM): ICD-10-CM

## 2019-06-05 DIAGNOSIS — E11.3292 CONTROLLED TYPE 2 DIABETES MELLITUS WITH LEFT EYE AFFECTED BY MILD NONPROLIFERATIVE RETINOPATHY WITHOUT MACULAR EDEMA, WITHOUT LONG-TERM CURRENT USE OF INSULIN (HCC): ICD-10-CM

## 2019-06-05 NOTE — TELEPHONE ENCOUNTER
Requested Prescriptions     Pending Prescriptions Disp Refills    canagliflozin-metFORMIN (INVOKAMET)  mg tab 30 Tab 2     Sig: Take 1 Tab by mouth daily. Patient has scheduled a follow up appointment for the next thing available in September.

## 2019-06-06 NOTE — TELEPHONE ENCOUNTER
PCP: Dana Madrid DO    Last appt: 2/11/2019  Future Appointments   Date Time Provider [place]   9/3/2019  2:30 PM Dana Madrid DO [facility]       Requested Prescriptions     Pending Prescriptions Disp Refills    canagliflozin-metFORMIN (INVOKAMET)  mg tab 30 Tab 2     Sig: Take 1 Tab by mouth daily.        Prior labs and Blood pressures:  BP Readings from Last 3 Encounters:   03/02/19 134/74   02/11/19 120/78   06/29/18 132/84     Lab Results   Component Value Date/Time    Sodium 139 02/11/2019 10:18 AM    Potassium 4.5 02/11/2019 10:18 AM    Chloride 101 02/11/2019 10:18 AM    CO2 23 02/11/2019 10:18 AM    Anion gap 8 01/22/2010 08:30 AM    Glucose 117 (H) 02/11/2019 10:18 AM    BUN 15 02/11/2019 10:18 AM    Creatinine 0.61 02/11/2019 10:18 AM    BUN/Creatinine ratio 25 (H) 02/11/2019 10:18 AM    GFR est  02/11/2019 10:18 AM    GFR est non- 02/11/2019 10:18 AM    Calcium 9.6 02/11/2019 10:18 AM     Lab Results   Component Value Date/Time    Hemoglobin A1c 6.5 (H) 02/11/2019 10:18 AM    Hemoglobin A1c (POC) 6.0 12/29/2017 10:15 AM     Lab Results   Component Value Date/Time    Cholesterol, total 217 (H) 02/11/2019 10:18 AM    HDL Cholesterol 41 02/11/2019 10:18 AM    LDL, calculated 151 (H) 02/11/2019 10:18 AM    VLDL, calculated 25 02/11/2019 10:18 AM    Triglyceride 123 02/11/2019 10:18 AM    CHOL/HDL Ratio 6.5 (H) 01/22/2010 08:30 AM     Lab Results   Component Value Date/Time    Vitamin D 25-Hydroxy 25.2 (L) 09/27/2011 08:00 AM    VITAMIN D, 25-HYDROXY 35.5 02/11/2019 10:18 AM       Lab Results   Component Value Date/Time    TSH 3.500 02/11/2019 10:18 AM

## 2019-09-03 ENCOUNTER — OFFICE VISIT (OUTPATIENT)
Dept: FAMILY MEDICINE CLINIC | Age: 54
End: 2019-09-03

## 2019-09-03 VITALS
RESPIRATION RATE: 18 BRPM | WEIGHT: 184.5 LBS | OXYGEN SATURATION: 96 % | BODY MASS INDEX: 33.95 KG/M2 | TEMPERATURE: 98.1 F | DIASTOLIC BLOOD PRESSURE: 76 MMHG | SYSTOLIC BLOOD PRESSURE: 121 MMHG | HEART RATE: 82 BPM | HEIGHT: 62 IN

## 2019-09-03 DIAGNOSIS — Z80.3 FAMILY HISTORY OF BREAST CANCER: ICD-10-CM

## 2019-09-03 DIAGNOSIS — Z80.0 FAMILY HISTORY OF COLON CANCER: ICD-10-CM

## 2019-09-03 DIAGNOSIS — Z80.0 FAMILY HISTORY OF COLON CANCER IN FATHER: ICD-10-CM

## 2019-09-03 DIAGNOSIS — E55.9 VITAMIN D DEFICIENCY: ICD-10-CM

## 2019-09-03 DIAGNOSIS — E11.3292 CONTROLLED TYPE 2 DIABETES MELLITUS WITH LEFT EYE AFFECTED BY MILD NONPROLIFERATIVE RETINOPATHY WITHOUT MACULAR EDEMA, WITHOUT LONG-TERM CURRENT USE OF INSULIN (HCC): Primary | ICD-10-CM

## 2019-09-03 DIAGNOSIS — R63.5 WEIGHT GAIN: ICD-10-CM

## 2019-09-03 DIAGNOSIS — E78.2 MIXED DYSLIPIDEMIA: ICD-10-CM

## 2019-09-03 DIAGNOSIS — Z82.49 FAMILY HISTORY OF HEART DISEASE: ICD-10-CM

## 2019-09-03 DIAGNOSIS — Z83.3 FAMILY HISTORY OF DIABETES MELLITUS (DM): ICD-10-CM

## 2019-09-03 DIAGNOSIS — R81 GLUCOSURIA: ICD-10-CM

## 2019-09-03 DIAGNOSIS — Z83.511 FAMILY HISTORY OF GLAUCOMA IN FATHER: ICD-10-CM

## 2019-09-03 DIAGNOSIS — Z85.3 PERSONAL HISTORY OF BREAST CANCER: ICD-10-CM

## 2019-09-03 DIAGNOSIS — R80.9 MICROALBUMINURIA: ICD-10-CM

## 2019-09-03 DIAGNOSIS — R76.8 POSITIVE HEPATITIS C ANTIBODY TEST: ICD-10-CM

## 2019-09-03 DIAGNOSIS — Z12.11 COLON CANCER SCREENING: ICD-10-CM

## 2019-09-03 LAB — HBA1C MFR BLD HPLC: 6.3 %

## 2019-09-03 RX ORDER — ROSUVASTATIN CALCIUM 40 MG/1
40 TABLET, COATED ORAL
Qty: 90 TAB | Refills: 3 | Status: SHIPPED | OUTPATIENT
Start: 2019-09-03

## 2019-09-03 NOTE — PROGRESS NOTES
Júniorseymour Rosygarland  Identified pt with two pt identifiers(name and ). Chief Complaint   Patient presents with    Diabetes     Rm 12         1. Have you been to the ER, urgent care clinic since your last visit? Hospitalized since your last visit? NO    2. Have you seen or consulted any other health care providers outside of the [address] [place] [address] since your last visit? Include any pap smears or colon screening. NO            Weight Metrics 9/3/2019 3/2/2019 2019 2018 2017 2017 3/23/2017   Weight 184 lb 8 oz 179 lb 178 lb 4.8 oz 175 lb 168 lb 3.2 oz 162 lb 3.2 oz 158 lb   BMI 33.75 kg/m2 32.74 kg/m2 32.6 kg/m2 32 kg/m2 30.75 kg/m2 29.66 kg/m2 28.9 kg/m2         Medication reconciliation up to date and correct with patient at this time. My Chart     My chart gives you direct online access to portions of the electronic medical record (EMR) where your doctor stores your health information (ie, lab results, appointment information, medications, immunizations, and more. It is free. Would you like to set up your my chart? YES      Advance Care Planning    In the event something were to happen to you and you were unable to speak on your behalf, do you have an Advance Directive/ Living Will in place stating your wishes? NO    If yes, do we have a copy on file NO    If no, would you like information YES      ====Kristian Fields Invitation====    Patient was invited to [facility] on this date and given the information folder for review. Recommended appointment with Kristian Fields facilitator for ACP conversation regarding advance directives. [x] Yes  [] No  Referral sent to [facility] Choices team member or Coordinator for follow-up    [] Yes  [x] No  Patient scheduled an appointment. Site of Referral: [facility]      Today's provider has been notified of reason for visit, vitals and flowsheets obtained on patients.      Reviewed record in preparation for visit, huddled with provider and have obtained necessary documentation.       Health Maintenance Due   Topic    Pneumococcal 0-64 years (1 of 1 - PPSV23)    Shingrix Vaccine Age 50> (1 of 2)    FOBT Q 1 YEAR AGE 50-75     MICROALBUMIN Q1     HEMOGLOBIN A1C Q6M     BREAST CANCER SCRN MAMMOGRAM        Wt Readings from Last 3 Encounters:   09/03/19 184 lb 8 oz (83.7 kg)   03/02/19 179 lb (81.2 kg)   02/11/19 178 lb 4.8 oz (80.9 kg)     Temp Readings from Last 3 Encounters:   09/03/19 98.1 °F (36.7 °C) (Oral)   03/02/19 98.6 °F (37 °C)   02/11/19 97.3 °F (36.3 °C) (Oral)     BP Readings from Last 3 Encounters:   09/03/19 121/76   03/02/19 134/74   02/11/19 120/78     Pulse Readings from Last 3 Encounters:   09/03/19 82   03/02/19 76   02/11/19 83     Vitals:    09/03/19 1438   BP: 121/76   Pulse: 82   Resp: 18   Temp: 98.1 °F (36.7 °C)   TempSrc: Oral   SpO2: 96%   Weight: 184 lb 8 oz (83.7 kg)   Height: 5' 2\" (1.575 m)   PainSc:   0 - No pain         Learning Assessment:  :     Learning Assessment 8/1/2016   PRIMARY LEARNER Patient   HIGHEST LEVEL OF EDUCATION - PRIMARY LEARNER  4 YEARS OF COLLEGE   BARRIERS PRIMARY LEARNER NONE   CO-LEARNER CAREGIVER No   PRIMARY LANGUAGE ENGLISH   LEARNER PREFERENCE PRIMARY READING     VIDEOS   ANSWERED BY Patient   RELATIONSHIP SELF       Depression Screening:  :     3 most recent PHQ Screens 9/3/2019   PHQ Not Done -   Little interest or pleasure in doing things Not at all   Feeling down, depressed, irritable, or hopeless Not at all   Total Score PHQ 2 0   Trouble falling or staying asleep, or sleeping too much Not at all   Feeling tired or having little energy Not at all   Poor appetite, weight loss, or overeating Not at all   Feeling bad about yourself - or that you are a failure or have let yourself or your family down Not at all   Trouble concentrating on things such as school, work, reading, or watching TV Not at all   Moving or speaking so slowly that other people could have noticed; or the opposite being so fidgety that others notice Not at all   Thoughts of being better off dead, or hurting yourself in some way Not at all   PHQ 9 Score 0   How difficult have these problems made it for you to do your work, take care of your home and get along with others Not difficult at all       Fall Risk Assessment:  :     Fall Risk Assessment, last 12 mths 9/3/2019   Able to walk? Yes   Fall in past 12 months? No       Abuse Screening:  :     Abuse Screening Questionnaire 9/3/2019 8/1/2016   Do you ever feel afraid of your partner? N N   Are you in a relationship with someone who physically or mentally threatens you? N N   Is it safe for you to go home?  Y Y       ADL Screening:  :     ADL Assessment 9/3/2019   Feeding yourself No Help Needed   Getting from bed to chair No Help Needed   Getting dressed No Help Needed   Bathing or showering No Help Needed   Walk across the room (includes cane/walker) No Help Needed   Using the telphone No Help Needed   Taking your medications No Help Needed   Preparing meals No Help Needed   Managing money (expenses/bills) No Help Needed   Moderately strenuous housework (laundry) No Help Needed   Shopping for personal items (toiletries/medicines) No Help Needed   Shopping for groceries No Help Needed   Driving No Help Needed   Climbing a flight of stairs No Help Needed   Getting to places beyond walking distances No Help Needed

## 2019-09-03 NOTE — PROGRESS NOTES
HISTORY OF PRESENT ILLNESS  Rober Colon is a 53 y.o. female presents with Diabetes (Rm 12); Referral Follow Up; Results; and Referral Request    Agree with nurse note. Pt with dyslipidemia, microalbuminuria, vit d deficiency, glucosuria, and family hx of glaucoma, breast ca, diabetes, and heart disease presents to the office with a BP of 121/76. Pt thinks she takes Crestor 40 mg once weekly because she will forget to take it every night. Pt requests to review results from 2/11/2019. , HDL 41, trigs 123, CBC wnl, vit d 35.5, TSH 3.5, Cr 0.61, glucose 117, LFTs wnl, urinalysis showed a trace of glucose. Pt with controlled type 2 DM. She takes Invokamet  mg daily and enalapril 2.5 mg daily; tolerating well. A1C today is 6.3, down from 6.5 on 2/11/2019. Pt is planning to schedule her eye exam with Dr. Kristine Vergara. Her last appointment with him was 4/18/2018. The note mentions mild non-proliferative retinopathy of L eye. Pt is up 9 lbs since 6/29/2019. She attributes this to reduced exercise. Pt with hx of breast ca. She had L lumpectomy with lymph node dissection in 1999 and was followed by Dr. Merced Melchor. She was recently released from his care. She gets yearly mammograms with [facility]. Health Maintenance     Pt with family hx of colon ca has not had a colonoscopy. She requests a referral to a different GI than Dr. Darcy Garcia since her  goes to Dr. Darcy Garcia. She will f/u with Dr. Natalie Wynn to get her pap scheduled. Written by ralph Patrick, as dictated by Dr. Mariano Perez DO.    ROS    Review of Systems negative except as noted above in HPI.     ALLERGIES:  No Known Allergies    CURRENT MEDICATIONS:    Outpatient Medications Marked as Taking for the 9/3/19 encounter (Office Visit) with Karly Lau DO   Medication Sig Dispense Refill    varicella-zoster recombinant, PF, (SHINGRIX, PF,) 50 mcg/0.5 mL susr injection 0.5 mL by IntraMUSCular route once for 1 dose. 0.5 mL 1    rosuvastatin (CRESTOR) 40 mg tablet Take 1 Tab by mouth nightly. Indications: excessive fat in the blood 90 Tab 3    canagliflozin-metFORMIN (INVOKAMET)  mg tab Take 1 Tab by mouth daily. Indications: type 2 diabetes mellitus 30 Tab 11    enalapril (VASOTEC) 2.5 mg tablet TAKE 1 TABLET BY MOUTH DAILY 90 Tab 3    cholecalciferol (VITAMIN D3) 1,000 unit cap Take  by mouth daily. PAST MEDICAL HISTORY:    Past Medical History:   Diagnosis Date    Breast cancer ([facility]     Left. Txd with chemo,radiation. Dr. Leticia Olmstead of elbow     Left.  Chickenpox childhood    Diabetes mellitus type 2, controlled, without complications ([facility]     Dr. Jade Vanessa. Dr. Maritza Rodriguez, ophth.  Dry eyes     Blair Garcias, OD. Dr. Harshal Cummins Elevated cholesterol     Environmental allergies     Gestational diabetes     Glaucoma suspect of both eyes     [facility]. Dr. Cullen Maier.  Heartburn 08    Mononucleosis 2006    Vitamin D deficiency        PAST SURGICAL HISTORY:    Past Surgical History:   Procedure Laterality Date    HX BREAST BIOPSY Left 1998    due to cancer. Dr. Lexx Corcoran HX BREAST LUMPECTOMY Left 1999    with 18 lymph nodes removed.  Dr. Ge Malik   ,     Dr. Sylvester Cannon:    Family History   Problem Relation Age of Onset    Hypertension Mother     Diabetes Mother     High Cholesterol Mother     Heart Disease Father     Heart Surgery Father         CABG    Diabetes Father     Glaucoma Father     Colon Cancer Father 68    Breast Cancer Maternal Aunt         x2    Cancer Paternal Uncle         ?laryngeal    Diabetes Paternal Grandmother     Glaucoma Paternal Aunt        SOCIAL HISTORY:    Social History     Socioeconomic History    Marital status:      Spouse name: Not on file    Number of children: Not on file    Years of education: Not on file    Highest education level: Not on file   Tobacco Use    Smoking status: Never Smoker    Smokeless tobacco: Never Used   Substance and Sexual Activity    Alcohol use: Yes     Alcohol/week: 0.0 standard drinks     Comment: RARE    Drug use: No    Sexual activity: Yes     Partners: Male     Birth control/protection: Surgical     Comment:  had a vasectomy       IMMUNIZATIONS:    Immunization History   Administered Date(s) Administered    Influenza Vaccine 11/09/2015, 10/17/2017, 10/11/2018    Influenza Vaccine (Quad) Mdck Pf 10/21/2018    Influenza Vaccine Split 11/03/2011    Influenza Vaccine Whole 10/01/2009         PHYSICAL EXAMINATION    Vital Signs    Visit Vitals  /76   Pulse 82   Temp 98.1 °F (36.7 °C) (Oral)   Resp 18   Ht 5' 2\" (1.575 m)   Wt 184 lb 8 oz (83.7 kg)   SpO2 96%   BMI 33.75 kg/m²       Weight Metrics 9/3/2019 3/2/2019 2/11/2019 6/29/2018 12/29/2017 6/29/2017 3/23/2017   Weight 184 lb 8 oz 179 lb 178 lb 4.8 oz 175 lb 168 lb 3.2 oz 162 lb 3.2 oz 158 lb   BMI 33.75 kg/m2 32.74 kg/m2 32.6 kg/m2 32 kg/m2 30.75 kg/m2 29.66 kg/m2 28.9 kg/m2       General appearance - Well nourished. Well appearing. Well developed. No acute distress. Obese. Head - Normocephalic. Atraumatic. Eyes - pupils equal and reactive. Extraocular eye movements intact. Sclera anicteric. Mildly injected sclera. Ears - Hearing is grossly normal bilaterally. Nose - normal and patent. No polyps noted. No erythema. No discharge. Mouth - mucous membranes with adequate moisture. Posterior pharynx normal with cobblestone appearance. No erythema, white exudate or obstruction. Neck - supple. Midline trachea. No carotid bruits noted bilaterally. No thyromegaly noted. Chest - clear to auscultation bilaterally anteriorly and posteriorly. No wheezes. No rales or rhonchi. Breath sounds are symmetrical bilaterally. Unlabored respirations. Heart - normal rate. Regular rhythm. Normal S1, S2. No murmur noted. No rubs, clicks or gallops noted. Abdomen - soft and distended. No masses or organomegaly. No rebound, rigidity or guarding. Bowel sounds normal x 4 quadrants. No tenderness noted. Neurological - awake, alert and oriented to person, place, and time and event. Cranial nerves II through XII intact. Clear speech. Muscle strength is +5/5 x 4 extremities. Sensation is intact to light touch bilaterally. Steady gait. Heme/Lymph - peripheral pulses normal x 4 extremities. No peripheral edema is noted. Musculoskeletal - Intact x 4 extremities. Full ROM x 4 extremities. No pain with movement. Back exam - normal range of motion. No pain on palpation of the spinous processes in the cervical, thoracic, lumbar, sacral regions. No CVA tenderness. Skin - no rashes, erythema, ecchymosis, lacerations, abrasions, suspicious moles noted  Psychological -   normal behavior, dress and thought processes. Good insight. Good eye contact. Normal affect. Appropriate mood. Normal speech. DATA REVIEWED    Lab Results   Component Value Date/Time    WBC 5.8 02/11/2019 10:18 AM    HGB 13.8 02/11/2019 10:18 AM    HCT 42.3 02/11/2019 10:18 AM    PLATELET 441 22/42/0388 10:18 AM    MCV 90 02/11/2019 10:18 AM     Lab Results   Component Value Date/Time    Sodium 139 02/11/2019 10:18 AM    Potassium 4.5 02/11/2019 10:18 AM    Chloride 101 02/11/2019 10:18 AM    CO2 23 02/11/2019 10:18 AM    Anion gap 8 01/22/2010 08:30 AM    Glucose 117 (H) 02/11/2019 10:18 AM    BUN 15 02/11/2019 10:18 AM    Creatinine 0.61 02/11/2019 10:18 AM    BUN/Creatinine ratio 25 (H) 02/11/2019 10:18 AM    GFR est  02/11/2019 10:18 AM    GFR est non- 02/11/2019 10:18 AM    Calcium 9.6 02/11/2019 10:18 AM    Bilirubin, total 0.3 02/11/2019 10:18 AM    AST (SGOT) 20 02/11/2019 10:18 AM    Alk.  phosphatase 81 02/11/2019 10:18 AM    Protein, total 7.4 02/11/2019 10:18 AM    Albumin 4.6 02/11/2019 10:18 AM    Globulin 3.3 01/22/2010 08:30 AM    A-G Ratio 1.6 02/11/2019 10:18 AM    ALT (SGPT) 19 02/11/2019 10:18 AM     Lab Results   Component Value Date/Time    Cholesterol, total 217 (H) 02/11/2019 10:18 AM    HDL Cholesterol 41 02/11/2019 10:18 AM    LDL, calculated 151 (H) 02/11/2019 10:18 AM    VLDL, calculated 25 02/11/2019 10:18 AM    Triglyceride 123 02/11/2019 10:18 AM    CHOL/HDL Ratio 6.5 (H) 01/22/2010 08:30 AM     Lab Results   Component Value Date/Time    Vitamin D 25-Hydroxy 25.2 (L) 09/27/2011 08:00 AM    VITAMIN D, 25-HYDROXY 35.5 02/11/2019 10:18 AM       Lab Results   Component Value Date/Time    Hemoglobin A1c 6.5 (H) 02/11/2019 10:18 AM    Hemoglobin A1c (POC) 6.3 09/03/2019 02:46 PM     Lab Results   Component Value Date/Time    TSH 3.500 02/11/2019 10:18 AM       Lab Results   Component Value Date/Time    Microalb/Creat ratio (ug/mg creat.) <8.0 06/29/2018 09:40 AM     On 12/21/2016, pt had a positive Hep C Ab 2.3. Qualitive confirmatory test was negative on 12/28/2019. ASSESSMENT and PLAN      ICD-10-CM ICD-9-CM    1. Controlled type 2 diabetes mellitus with left eye affected by mild nonproliferative retinopathy without macular edema, without long-term current use of insulin ([facility]) E11.3292 250.50 AMB POC HEMOGLOBIN A1C     362.04 canagliflozin-metFORMIN (INVOKAMET)  mg tab    stable on Invokamet   2. Mixed dyslipidemia E78.2 272.2 rosuvastatin (CRESTOR) 40 mg tablet      LIPID PANEL      METABOLIC PANEL, COMPREHENSIVE    improving with Crestor 40 mg off and on   3. Microalbuminuria R80.9 791.0 MICROALBUMIN, UR, RAND W/ MICROALB/CREAT RATIO   4. Vitamin D deficiency E55.9 268.9    5. Glucosuria R81 791.5     due to Invokamet   6. Weight gain R63.5 783.1     due to diet changes vs other   7. Personal history of breast cancer Z85.3 V10.3     Left, resolved after lumpectomy   8. Family history of glaucoma in father Z80.65 V19.11    5.  Family history of colon cancer Z80.0 V16.0 REFERRAL TO GASTROENTEROLOGY   10. Family history of breast cancer Z80.3 V16.3    11. Family history of heart disease Z82.49 V17.49    12. Family history of colon cancer in father Z80.0 V16.0 REFERRAL TO GASTROENTEROLOGY   15. Colon cancer screening Z12.11 V76.51 REFERRAL TO GASTROENTEROLOGY   14. Family history of diabetes mellitus (DM) Z83.3 V18.0 canagliflozin-metFORMIN (INVOKAMET)  mg tab   15. Positive hepatitis C antibody test R76.8 795.79     due to false positive       Discussed the patient's BMI with her. The BMI follow up plan is as follows: I have counseled this patient on diet and exercise regimens. Decrease carbohydrates (white foods, sweet foods, sweet drinks and alcohol), increase green leafy vegetables and protein (lean meats and beans) with each meal.  Avoid fried foods. Eat 3-5 small meals daily. Do not skip meals. Increase water intake. Increase physical activity to 30 minutes daily for health benefit or 60 minutes daily to prevent weight regain, as tolerated. Get 7-8 hours uninterrupted sleep nightly. Chart reviewed and updated. Continue current medications and care. Recommended she take Crestor more routinely at night instead of further increasing statin at this time. Pt expresses understanding and is agreeable. Prescriptions written and sent to pharmacy; medication side effects discussed. Invokamet  mg, Crestor 40 mg  Most recent tests reviewed from 2/11/2019. Recheck pertinent labs. Referrals given; patient urged to keep appointments with specialists. GI  Counseled patient on health concerns:  Cholesterol, diabetes, weight, hx of breast ca, vit d, glucosuria, family hx of colon ca  Relevant handouts given and discussed with patient. Immunizations noted. Advised pt to get Shingrix vaccine with local pharmacy. Offered empathy, support, legitimation, prayers, partnership to patient. Praised patient for progress.     Patient was offered a choice/choices in the treatment plan today. Patient expresses understanding of the plan and agrees with recommendations. Written by ralph Schumacher, as dictated by Dr. Dorian Gilliam DO. Documentation True and Accepted by Valery Lim. There are no Patient Instructions on file for this visit.

## 2020-06-15 ENCOUNTER — VIRTUAL VISIT (OUTPATIENT)
Dept: FAMILY MEDICINE CLINIC | Age: 55
End: 2020-06-15

## 2020-06-15 DIAGNOSIS — R80.9 MICROALBUMINURIA: ICD-10-CM

## 2020-06-15 DIAGNOSIS — E55.9 VITAMIN D DEFICIENCY: ICD-10-CM

## 2020-06-15 DIAGNOSIS — E11.9 CONTROLLED TYPE 2 DIABETES MELLITUS WITHOUT COMPLICATION, WITHOUT LONG-TERM CURRENT USE OF INSULIN (HCC): ICD-10-CM

## 2020-06-15 DIAGNOSIS — R11.0 NAUSEA: ICD-10-CM

## 2020-06-15 DIAGNOSIS — Z83.511 FAMILY HISTORY OF GLAUCOMA IN FATHER: ICD-10-CM

## 2020-06-15 DIAGNOSIS — Z80.0 FAMILY HISTORY OF COLON CANCER: ICD-10-CM

## 2020-06-15 DIAGNOSIS — E78.2 MIXED DYSLIPIDEMIA: ICD-10-CM

## 2020-06-15 DIAGNOSIS — H01.003 BLEPHARITIS OF BOTH EYES, UNSPECIFIED EYELID, UNSPECIFIED TYPE: ICD-10-CM

## 2020-06-15 DIAGNOSIS — Z80.3 FAMILY HISTORY OF BREAST CANCER: ICD-10-CM

## 2020-06-15 DIAGNOSIS — H01.006 BLEPHARITIS OF BOTH EYES, UNSPECIFIED EYELID, UNSPECIFIED TYPE: ICD-10-CM

## 2020-06-15 DIAGNOSIS — G44.201 ACUTE INTRACTABLE TENSION-TYPE HEADACHE: Primary | ICD-10-CM

## 2020-06-15 DIAGNOSIS — Z85.3 PERSONAL HISTORY OF BREAST CANCER: ICD-10-CM

## 2020-06-15 DIAGNOSIS — Z82.49 FAMILY HISTORY OF HEART DISEASE: ICD-10-CM

## 2020-06-15 DIAGNOSIS — M54.2 NECK PAIN: ICD-10-CM

## 2020-06-15 RX ORDER — ENALAPRIL MALEATE 5 MG/1
5 TABLET ORAL DAILY
Qty: 90 TAB | Refills: 1 | Status: CANCELLED | OUTPATIENT
Start: 2020-06-15

## 2020-06-15 RX ORDER — ENALAPRIL MALEATE 2.5 MG/1
TABLET ORAL
Qty: 90 TAB | Refills: 3 | Status: SHIPPED | OUTPATIENT
Start: 2020-06-15

## 2020-06-15 RX ORDER — ACETAMINOPHEN 500 MG
1 TABLET ORAL DAILY
Qty: 1 KIT | Refills: 0 | Status: SHIPPED | OUTPATIENT
Start: 2020-06-15

## 2020-06-15 NOTE — PROGRESS NOTES
VIRTUAL VISIT      Pursuant to the emergency declaration under the [employer] and [facility], 1135 waiver authority and the [employer] Act, this Virtual Visit was conducted, with patient's consent, to reduce the patient's risk of exposure to COVID-19 and provide continuity of care for an established patient. Services were provided through a video synchronous discussion virtually to substitute for in-person appointment. Consent:  She and/or her healthcare decision maker is aware that this patient-initiated Telehealth encounter is a billable service, with coverage as determined by her insurance carrier. She is aware that she may receive a bill and has provided verbal consent to proceed: Yes    HISTORY OF PRESENT ILLNESS  Edmund Thomson is a 53 y.o. female presents with Medication Refill and Diabetes    Agree with nurse note. Pt with controlled type 2 DM, microalbuminuria, mixed dyslipidemia, vit d deficiency, personal hx of breast ca, and family hx of glaucoma, breast ca, colon ca, and heart disease. Pt does not have a BP cuff and is unable to provide a BP today. Pt takes Crestor 40 mg qPM; tolerating well. She takes Invokamet  mg daily and enalapril 2.5 mg daily; tolerating well. To recall labs from 2/11/2019, glucose in urine, rosangela 123, HDL 41, , glucose 117, Cr 0.61, Na 139, K 4.5, LFTs wnl, TSH 3.5, vit d 35.5, A1C 6.5, CBC wnl. A1C 6.3 on 9/3/2019. Pt went to [facility] on 5/14/2020 for BL blepharitis of eyes. Tx'd with bacitracin. Sxs have resolved. Pt went to [facility] in 5/22/2020 for intractable tension-type headache, nausea, and neck pain. 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 This has distorted her normal meal routine. She was not tested for Covid.  Patient denies fever, chills, ear pain, dizziness, nasal congestion, nasal discharge, post nasal drainage, sore throat, itchy or watery eyes, chest pain or tightness, SOB, wheezing, cough, GI symptoms, bladder symptoms and body aches. Written by ralph Dhillon, as dictated by DO. AYALA Arana    Review of Systems negative except as noted above in HPI. ALLERGIES:  No Known Allergies    CURRENT MEDICATIONS:      PAST MEDICAL HISTORY:    Past Medical History:   Diagnosis Date    Breast cancer ([email or web address]     Left. Txd with chemo,radiation. Dr. Aldana Drop of elbow     Left.  Chickenpox childhood    Diabetes mellitus type 2, controlled, without complications ([email or web address]     Dr. Roselyn Wellington. Dr. Avalos Bridbinh, ophth.  Dry eyes     Blair Garcias, OD. Dr. Astrid Pagan Elevated cholesterol     Environmental allergies     Gestational diabetes     Glaucoma suspect of both eyes     [facility] [address] Dr. Nicki Mcburney.  Heartburn 08    Mononucleosis 2006    Vitamin D deficiency        PAST SURGICAL HISTORY:    Past Surgical History:   Procedure Laterality Date    HX BREAST BIOPSY Left 1998    due to cancer. Dr. Nkechi Nguyen HX BREAST LUMPECTOMY Left 1999    with 18 lymph nodes removed.  Dr. Deepti Cornejo   ,     Dr. Aggie Kirby:    Family History   Problem Relation Age of Onset    Hypertension Mother     Diabetes Mother     High Cholesterol Mother     Heart Disease Father     Heart Surgery Father         CABG    Diabetes Father     Glaucoma Father     Colon Cancer Father 68    Breast Cancer Maternal Aunt         x2    Cancer Paternal Uncle         ?laryngeal    Diabetes Paternal Grandmother     Glaucoma Paternal Aunt        SOCIAL HISTORY:    Social History     Socioeconomic History    Marital status:      Spouse name: Not on file    Number of children: Not on file    Years of education: Not on file    Highest education level: Not on file   Tobacco Use    Smoking status: Never Smoker    Smokeless tobacco: Never Used   Substance and Sexual Activity    Alcohol use: Yes     Alcohol/week: 0.0 standard drinks     Comment: RARE    Drug use: No    Sexual activity: Yes     Partners: Male     Birth control/protection: Surgical     Comment:  had a vasectomy       IMMUNIZATIONS:    Immunization History   Administered Date(s) Administered    Influenza Vaccine 11/09/2015, 10/17/2017, 10/11/2018    Influenza Vaccine (Quad) Mdck Pf 10/21/2018    Influenza Vaccine Split 11/03/2011    Influenza Vaccine Whole 10/01/2009         PHYSICAL EXAMINATION (PER VISUAL INSPECTION AND INSTRUCTIONS GIVEN TO PATIENT TO PERFORM)    Due to this being a TeleHealth encounter (During JOXNB-57 public health emergency), evaluation of the following organ systems was limited to:     Vital Signs  There were no vitals taken for this visit. Weight Metrics 9/3/2019 3/2/2019 2/11/2019 6/29/2018 12/29/2017 6/29/2017 3/23/2017   Weight 184 lb 8 oz 179 lb 178 lb 4.8 oz 175 lb 168 lb 3.2 oz 162 lb 3.2 oz 158 lb   BMI 33.75 kg/m2 32.74 kg/m2 32.6 kg/m2 32 kg/m2 30.75 kg/m2 29.66 kg/m2 28.9 kg/m2       General appearance - Well nourished. Well appearing. Well developed. No acute distress. Obese. Head - Normocephalic. Atraumatic. Eyes - Extraocular eye movements intact. Sclera anicteric. Mildly injected sclera. Ears - Hearing is grossly normal bilaterally. Nose - normal and patent. No discharge. Chest - No visualized signs of difficulty breathing or respiratory distress. Heart - normal rate. Neurological - awake, alert and oriented to person, place, and time and event. Cranial nerves II through XII intact. Clear speech. Musculoskeletal - Intact x 4 extremities. No pain with movement. Psychological -   normal behavior, dress and thought processes. Good insight. Good eye contact. Normal affect. Appropriate mood. Normal speech.       DATA REVIEWED    Lab Results   Component Value Date/Time    WBC 5.8 02/11/2019 10:18 AM    HGB 13.8 02/11/2019 10:18 AM    HCT 42.3 02/11/2019 10:18 AM    PLATELET 618 84/24/2095 10:18 AM    MCV 90 02/11/2019 10:18 AM     Lab Results   Component Value Date/Time    Sodium 139 02/11/2019 10:18 AM    Potassium 4.5 02/11/2019 10:18 AM    Chloride 101 02/11/2019 10:18 AM    CO2 23 02/11/2019 10:18 AM    Anion gap 8 01/22/2010 08:30 AM    Glucose 117 (H) 02/11/2019 10:18 AM    BUN 15 02/11/2019 10:18 AM    Creatinine 0.61 02/11/2019 10:18 AM    BUN/Creatinine ratio 25 (H) 02/11/2019 10:18 AM    GFR est  02/11/2019 10:18 AM    GFR est non- 02/11/2019 10:18 AM    Calcium 9.6 02/11/2019 10:18 AM    Bilirubin, total 0.3 02/11/2019 10:18 AM    Alk. phosphatase 81 02/11/2019 10:18 AM    Protein, total 7.4 02/11/2019 10:18 AM    Albumin 4.6 02/11/2019 10:18 AM    Globulin 3.3 01/22/2010 08:30 AM    A-G Ratio 1.6 02/11/2019 10:18 AM    ALT (SGPT) 19 02/11/2019 10:18 AM     Lab Results   Component Value Date/Time    Cholesterol, total 217 (H) 02/11/2019 10:18 AM    HDL Cholesterol 41 02/11/2019 10:18 AM    LDL, calculated 151 (H) 02/11/2019 10:18 AM    VLDL, calculated 25 02/11/2019 10:18 AM    Triglyceride 123 02/11/2019 10:18 AM    CHOL/HDL Ratio 6.5 (H) 01/22/2010 08:30 AM     Lab Results   Component Value Date/Time    Vitamin D 25-Hydroxy 25.2 (L) 09/27/2011 08:00 AM    VITAMIN D, 25-HYDROXY 35.5 02/11/2019 10:18 AM       Lab Results   Component Value Date/Time    Hemoglobin A1c 6.5 (H) 02/11/2019 10:18 AM    Hemoglobin A1c (POC) 6.3 09/03/2019 02:46 PM     Lab Results   Component Value Date/Time    TSH 3.500 02/11/2019 10:18 AM       Lab Results   Component Value Date/Time    Microalb/Creat ratio (ug/mg creat.) <8.0 06/29/2018 09:40 AM         ASSESSMENT and PLAN      ICD-10-CM ICD-9-CM    1. Acute intractable tension-type headache G44.201 339.10     due to stress vs neck pain vs delayed meal vs other, resolved   2.  Controlled type 2 diabetes mellitus without complication, without long-term current use of insulin (HCC) E11.9 250.00 enalapril (VASOTEC) 2.5 mg tablet      Blood Pressure Monitor kit      LIPID PANEL      METABOLIC PANEL, COMPREHENSIVE      TSH 3RD GENERATION      VITAMIN D, 25 HYDROXY      MICROALBUMIN, UR, RAND W/ MICROALB/CREAT RATIO      URINALYSIS W/ RFLX MICROSCOPIC      HEMOGLOBIN A1C WITH EAG   3. Mixed dyslipidemia E78.2 272.2 LIPID PANEL      METABOLIC PANEL, COMPREHENSIVE   4. Neck pain M54.2 723.1     due to somatic dysfunction from increased computer use with longer work hours   5. Microalbuminuria R80.9 791.0 enalapril (VASOTEC) 2.5 mg tablet      MICROALBUMIN, UR, RAND W/ MICROALB/CREAT RATIO   6. Vitamin D deficiency E55.9 268.9 VITAMIN D, 25 HYDROXY   7. Personal history of breast cancer Z85.3 V10.3    8. Blepharitis of both eyes, unspecified eyelid, unspecified type H01.003 373.00     H01.006      resolved after eye antibiotics   9. Family history of glaucoma in father Z80.65 V19.11    8. Family history of colon cancer Z80.0 V16.0    6. Family history of breast cancer Z80.3 V16.3    12. Family history of heart disease Z82.49 V17.49    13. Nausea R11.0 787.02     due to low blood sugar from delayed meal vs other, resolved       Discussed the patient's BMI with her. The BMI follow up plan is as follows: I have counseled this patient on diet and exercise regimens. Decrease carbohydrates (white foods, sweet foods, sweet drinks and alcohol), increase green leafy vegetables and protein (lean meats and beans) with each meal.  Avoid fried foods. Eat 3-5 small meals daily. Do not skip meals. Increase water intake. Increase physical activity to 30 minutes daily for health benefit or 60 minutes daily to prevent weight regain, as tolerated. Get 7-8 hours uninterrupted sleep nightly. Chart reviewed and updated. Continue current medications and care. Reduce salt and caffeine intake to prevent swelling.   Prescriptions written and sent to pharmacy; medication side effects discussed. Enalapril 2.5 mg, BP monitoring kit  Lab orders mailed due to COVID-19. Most recent tests reviewed from 2/11/2019. Referrals given; patient urged to keep appointments with specialists. Counseled patient on health concerns:  Diabetes, headache, blepharitis, nausea, neck pain  Offered empathy, support, legitimation, prayers, partnership to patient. Praised patient for progress. Follow-up and Dispositions    · Return in about 3 months (around 9/15/2020) for blood pressure, results, diabetes, referral follow up. Encouraged the pt to sign up for [employer] to be able to view results and send me any questions or concerns prior to the next visit where we will go over results in detail. Patient was offered a choice/choices in the treatment plan today. Patient expresses understanding of the plan and agrees with recommendations. 26 mins spent face to face with patient and more than 50% of this time spent in counseling and coordinating care. Written by ralph Beebe, as dictated by Dr. Jerson Curry DO. Documentation True and Accepted by Khadar Morris.  Osmani Villalpando.

## 2020-06-15 NOTE — PROGRESS NOTES
Julien Tovar is a 53 y.o. female     Chief Complaint   Patient presents with    Medication Refill    Diabetes     1. Have you been to the ER, urgent care clinic since your last visit? Hospitalized since your last visit? Yes When: [facility] on Memorial Day weekend; went for fatigue and headache    2. Have you seen or consulted any other health care providers outside of the [address] [place] [address] since your last visit? Include any pap smears or colon screening.  No     Patient has not obtained vital signs today    Patient reports no pain today

## 2020-09-26 DIAGNOSIS — E11.3292 CONTROLLED TYPE 2 DIABETES MELLITUS WITH LEFT EYE AFFECTED BY MILD NONPROLIFERATIVE RETINOPATHY WITHOUT MACULAR EDEMA, WITHOUT LONG-TERM CURRENT USE OF INSULIN (HCC): ICD-10-CM

## 2020-09-26 DIAGNOSIS — Z83.3 FAMILY HISTORY OF DIABETES MELLITUS (DM): ICD-10-CM

## 2020-10-05 NOTE — TELEPHONE ENCOUNTER
----- Message from Ignacio Mondragon sent at 10/5/2020  8:59 AM EDT -----  Regarding: Dr. Val Garcia (if not patient): n/a  Relationship of caller (if not patient): n/a  Best contact number(s): [phone]  Name of medication and dosage if known: \"Invokamat\" 50/500mg  Is patient out of this medication (yes/no): yes  Pharmacy name: [facility] [address] listed in chart? (yes/no): yes  Pharmacy phone number: n/a  Date of last visit: 6/15/20  Details to clarify the request: Pt will need to schedule a lab visit as well as an in person visit. Please call pt as soon as possible to get those scheduled for November, (DOTTIE). As of right now she has a child that was exposed to [place] and cannot come in at this time. The Pharmacy has sent several requests for the refill.

## 2020-10-07 ENCOUNTER — TELEPHONE (OUTPATIENT)
Dept: FAMILY MEDICINE CLINIC | Age: 55
End: 2020-10-07

## 2020-10-07 DIAGNOSIS — E11.9 CONTROLLED TYPE 2 DIABETES MELLITUS WITHOUT COMPLICATION, WITHOUT LONG-TERM CURRENT USE OF INSULIN (HCC): Primary | ICD-10-CM

## 2020-10-07 RX ORDER — CANAGLIFLOZIN AND METFORMIN HYDROCHLORIDE 50; 500 MG/1; MG/1
1 TABLET, FILM COATED ORAL DAILY
Qty: 90 TAB | Refills: 0 | Status: SHIPPED | OUTPATIENT
Start: 2020-10-07 | End: 2021-01-14

## 2020-10-07 NOTE — TELEPHONE ENCOUNTER
The patient said that she had to come into the office for her medication refill. The patient will like a face to face visit.  Is tomorrow ok for her to come in?

## 2020-10-07 NOTE — TELEPHONE ENCOUNTER
Called patient. ID verified with Name and . Spoke with patient in regards to medication and appt. Informed patient that Dr. Marcie Johnson did provide verbal order for medication (Invokamat) to be filled, 90 day supply with no refills. Dr. Marcie Johnson stated that patient needs to have labs collected and does need to set up an appt to discuss. Provider states that appt can be a virtual appt. Patient stated that she would collect labs before scheduling an appt with Dr. Marcie Johnson. Patient states she would call back to schedule once this is done. Order sent to [facility] pharmacy per patient's request. No further actions required at this time.

## 2020-10-11 RX ORDER — CANAGLIFLOZIN AND METFORMIN HYDROCHLORIDE 50; 500 MG/1; MG/1
TABLET, FILM COATED ORAL
Qty: 30 TAB | Refills: 11 | Status: SHIPPED | OUTPATIENT
Start: 2020-10-11

## 2020-10-13 LAB
25(OH)D3+25(OH)D2 SERPL-MCNC: 30.1 NG/ML (ref 30–100)
ALBUMIN SERPL-MCNC: 4.7 G/DL (ref 3.8–4.9)
ALBUMIN/CREAT UR: 6 MG/G CREAT (ref 0–29)
ALBUMIN/GLOB SERPL: 1.7 {RATIO} (ref 1.2–2.2)
ALP SERPL-CCNC: 106 IU/L (ref 39–117)
ALT SERPL-CCNC: 42 IU/L (ref 0–32)
APPEARANCE UR: CLEAR
AST SERPL-CCNC: 37 IU/L (ref 0–40)
BILIRUB SERPL-MCNC: 0.4 MG/DL (ref 0–1.2)
BILIRUB UR QL STRIP: NEGATIVE
BUN SERPL-MCNC: 19 MG/DL (ref 6–24)
BUN/CREAT SERPL: 26 (ref 9–23)
CALCIUM SERPL-MCNC: 9.9 MG/DL (ref 8.7–10.2)
CHLORIDE SERPL-SCNC: 95 MMOL/L (ref 96–106)
CHOLEST SERPL-MCNC: 286 MG/DL (ref 100–199)
CO2 SERPL-SCNC: 22 MMOL/L (ref 20–29)
COLOR UR: YELLOW
CREAT SERPL-MCNC: 0.73 MG/DL (ref 0.57–1)
CREAT UR-MCNC: 152.2 MG/DL
EST. AVERAGE GLUCOSE BLD GHB EST-MCNC: 154 MG/DL
GLOBULIN SER CALC-MCNC: 2.8 G/DL (ref 1.5–4.5)
GLUCOSE SERPL-MCNC: 171 MG/DL (ref 65–99)
GLUCOSE UR QL: ABNORMAL
HBA1C MFR BLD: 7 % (ref 4.8–5.6)
HDLC SERPL-MCNC: 39 MG/DL
HGB UR QL STRIP: NEGATIVE
INTERPRETATION, 910389: NORMAL
KETONES UR QL STRIP: NEGATIVE
LDLC SERPL CALC-MCNC: 185 MG/DL (ref 0–99)
LEUKOCYTE ESTERASE UR QL STRIP: NEGATIVE
Lab: NORMAL
MICRO URNS: ABNORMAL
MICROALBUMIN UR-MCNC: 9.2 UG/ML
NITRITE UR QL STRIP: NEGATIVE
PH UR STRIP: 5.5 [PH] (ref 5–7.5)
POTASSIUM SERPL-SCNC: 4.4 MMOL/L (ref 3.5–5.2)
PROT SERPL-MCNC: 7.5 G/DL (ref 6–8.5)
PROT UR QL STRIP: NEGATIVE
SODIUM SERPL-SCNC: 135 MMOL/L (ref 134–144)
SP GR UR: >=1.03 (ref 1–1.03)
TRIGL SERPL-MCNC: 313 MG/DL (ref 0–149)
TSH SERPL DL<=0.005 MIU/L-ACNC: 4.24 UIU/ML (ref 0.45–4.5)
UROBILINOGEN UR STRIP-MCNC: 0.2 MG/DL (ref 0.2–1)
VLDLC SERPL CALC-MCNC: 62 MG/DL (ref 5–40)

## 2020-12-01 ENCOUNTER — TELEPHONE (OUTPATIENT)
Dept: FAMILY MEDICINE CLINIC | Age: 55
End: 2020-12-01

## 2020-12-01 DIAGNOSIS — R92.8 ABNORMAL MAMMOGRAM: Primary | ICD-10-CM

## 2020-12-01 NOTE — TELEPHONE ENCOUNTER
[facility] called and requested an order for bilateral diagnostic order be faxed to them as soon as possible.    Please call [phone]  B:[phone]

## 2020-12-08 ENCOUNTER — TELEPHONE (OUTPATIENT)
Dept: FAMILY MEDICINE CLINIC | Age: 55
End: 2020-12-08

## 2020-12-16 NOTE — PROGRESS NOTES
Lipids way off. Rec get heart scan to assess CAD risk and begin statin tx based upon results. DM borderline [facility]. Work on diet and exercise and recheck 3 mos.

## 2020-12-30 ENCOUNTER — DOCUMENTATION ONLY (OUTPATIENT)
Dept: FAMILY MEDICINE CLINIC | Age: 55
End: 2020-12-30

## 2021-01-04 DIAGNOSIS — E11.9 CONTROLLED TYPE 2 DIABETES MELLITUS WITHOUT COMPLICATION, WITHOUT LONG-TERM CURRENT USE OF INSULIN (HCC): ICD-10-CM

## 2021-01-14 NOTE — TELEPHONE ENCOUNTER
PCP: Anton Agarwal DO    Last appt: Visit date not found  No future appointments. Requested Prescriptions     Pending Prescriptions Disp Refills    Invokamet  mg tab [Pharmacy Med Name: INVOKAMET  MG TABLET] 30 Tab 2     Sig: TAKE 1 TABLET BY MOUTH [address]    Patient has 0 days' supply of medication available.     Prior labs and Blood pressures:  BP Readings from Last 3 Encounters:   09/03/19 121/76   03/02/19 134/74   02/11/19 120/78     Lab Results   Component Value Date/Time    Sodium 135 10/12/2020 07:47 AM    Potassium 4.4 10/12/2020 07:47 AM    Chloride 95 (L) 10/12/2020 07:47 AM    CO2 22 10/12/2020 07:47 AM    Anion gap 8 01/22/2010 08:30 AM    Glucose 171 (H) 10/12/2020 07:47 AM    BUN 19 10/12/2020 07:47 AM    Creatinine 0.73 10/12/2020 07:47 AM    BUN/Creatinine ratio 26 (H) 10/12/2020 07:47 AM    GFR est  10/12/2020 07:47 AM    GFR est non-AA 92 10/12/2020 07:47 AM    Calcium 9.9 10/12/2020 07:47 AM     Lab Results   Component Value Date/Time    Hemoglobin A1c 7.0 (H) 10/12/2020 07:47 AM    Hemoglobin A1c (POC) 6.3 09/03/2019 02:46 PM     Lab Results   Component Value Date/Time    Cholesterol, total 286 (H) 10/12/2020 07:47 AM    HDL Cholesterol 39 (L) 10/12/2020 07:47 AM    LDL, calculated 185 (H) 10/12/2020 07:47 AM    LDL, calculated 151 (H) 02/11/2019 10:18 AM    VLDL, calculated 62 (H) 10/12/2020 07:47 AM    VLDL, calculated 25 02/11/2019 10:18 AM    Triglyceride 313 (H) 10/12/2020 07:47 AM    CHOL/HDL Ratio 6.5 (H) 01/22/2010 08:30 AM     Lab Results   Component Value Date/Time    Vitamin D 25-Hydroxy 25.2 (L) 09/27/2011 08:00 AM    VITAMIN D, 25-HYDROXY 30.1 10/12/2020 07:47 AM       Lab Results   Component Value Date/Time    TSH 4.240 10/12/2020 07:47 AM

## 2021-01-14 NOTE — TELEPHONE ENCOUNTER
Writer contacted [facility] spoke with [place] regarding the e-scribed prescription on 10/11/2020 for Invokamet  mg #30 with 11 refills. Per [place] prescription was never received. Please resend. Requested Prescriptions     Pending Prescriptions Disp Refills    canagliflozin-metFORMIN (Invokamet)  mg tab [Pharmacy Med Name: INVOKAMET  MG TABLET] 90 Tab 0     Sig: Take 1 Tab by mouth daily.

## 2021-01-15 RX ORDER — CANAGLIFLOZIN AND METFORMIN HYDROCHLORIDE 50; 500 MG/1; MG/1
1 TABLET, FILM COATED ORAL DAILY
Qty: 90 TAB | Refills: 0 | Status: SHIPPED | OUTPATIENT
Start: 2021-01-15 | End: 2021-04-14

## 2023-06-19 NOTE — TELEPHONE ENCOUNTER
----- Message from Tilman Severance sent at 1/14/2021 10:58 AM EST -----  Regarding: /Refill  Contact: [phone]  Medication Refill    Caller (if not patient): N/A      Relationship of caller (if not patient): N/A      Best contact number(s): [phone]      Name of medication and dosage if known: \"Invokamet\" 50-500mg      Is patient out of this medication (yes/no): No      Pharmacy name: [facility]    Pharmacy listed in chart? (yes/no): Yes  Pharmacy phone number: [phone]      Details to clarify the request: Pt advised pharmacy sent request for medication and has yet to receive response.       Tilman Severance none